# Patient Record
Sex: FEMALE | Race: BLACK OR AFRICAN AMERICAN | NOT HISPANIC OR LATINO | URBAN - METROPOLITAN AREA
[De-identification: names, ages, dates, MRNs, and addresses within clinical notes are randomized per-mention and may not be internally consistent; named-entity substitution may affect disease eponyms.]

---

## 2024-07-22 ENCOUNTER — INPATIENT (INPATIENT)
Facility: HOSPITAL | Age: 29
LOS: 5 days | Discharge: HOME | DRG: 761 | End: 2024-07-28
Attending: OBSTETRICS & GYNECOLOGY | Admitting: OBSTETRICS & GYNECOLOGY
Payer: SELF-PAY

## 2024-07-22 ENCOUNTER — TRANSCRIPTION ENCOUNTER (OUTPATIENT)
Age: 29
End: 2024-07-22

## 2024-07-22 VITALS
HEIGHT: 67 IN | SYSTOLIC BLOOD PRESSURE: 135 MMHG | HEART RATE: 120 BPM | RESPIRATION RATE: 15 BRPM | DIASTOLIC BLOOD PRESSURE: 93 MMHG | TEMPERATURE: 98 F | WEIGHT: 259.93 LBS | OXYGEN SATURATION: 100 %

## 2024-07-22 DIAGNOSIS — N83.519 TORSION OF OVARY AND OVARIAN PEDICLE, UNSPECIFIED SIDE: ICD-10-CM

## 2024-07-22 LAB
ADD ON TEST-SPECIMEN IN LAB: SIGNIFICANT CHANGE UP
ALBUMIN SERPL ELPH-MCNC: 3.7 G/DL — SIGNIFICANT CHANGE UP (ref 3.3–5)
ALP SERPL-CCNC: 211 U/L — HIGH (ref 40–120)
ALT FLD-CCNC: 46 U/L — HIGH (ref 10–45)
ANION GAP SERPL CALC-SCNC: 17 MMOL/L — SIGNIFICANT CHANGE UP (ref 5–17)
APPEARANCE UR: ABNORMAL
APTT BLD: 39.6 SEC — HIGH (ref 24.5–35.6)
AST SERPL-CCNC: 32 U/L — SIGNIFICANT CHANGE UP (ref 10–40)
BACTERIA # UR AUTO: ABNORMAL /HPF
BASOPHILS # BLD AUTO: 0.02 K/UL — SIGNIFICANT CHANGE UP (ref 0–0.2)
BASOPHILS NFR BLD AUTO: 0.2 % — SIGNIFICANT CHANGE UP (ref 0–2)
BILIRUB SERPL-MCNC: 0.5 MG/DL — SIGNIFICANT CHANGE UP (ref 0.2–1.2)
BILIRUB UR-MCNC: ABNORMAL
BLD GP AB SCN SERPL QL: NEGATIVE — SIGNIFICANT CHANGE UP
BUN SERPL-MCNC: 17 MG/DL — SIGNIFICANT CHANGE UP (ref 7–23)
CALCIUM SERPL-MCNC: 9.6 MG/DL — SIGNIFICANT CHANGE UP (ref 8.4–10.5)
CAST: 8 /LPF — HIGH (ref 0–4)
CHLORIDE SERPL-SCNC: 99 MMOL/L — SIGNIFICANT CHANGE UP (ref 96–108)
CO2 SERPL-SCNC: 23 MMOL/L — SIGNIFICANT CHANGE UP (ref 22–31)
COLOR SPEC: SIGNIFICANT CHANGE UP
CREAT SERPL-MCNC: 0.95 MG/DL — SIGNIFICANT CHANGE UP (ref 0.5–1.3)
DIFF PNL FLD: ABNORMAL
EGFR: 83 ML/MIN/1.73M2 — SIGNIFICANT CHANGE UP
EOSINOPHIL # BLD AUTO: 0.06 K/UL — SIGNIFICANT CHANGE UP (ref 0–0.5)
EOSINOPHIL NFR BLD AUTO: 0.5 % — SIGNIFICANT CHANGE UP (ref 0–6)
FLUAV AG NPH QL: SIGNIFICANT CHANGE UP
FLUBV AG NPH QL: SIGNIFICANT CHANGE UP
GLUCOSE SERPL-MCNC: 121 MG/DL — HIGH (ref 70–99)
GLUCOSE UR QL: NEGATIVE MG/DL — SIGNIFICANT CHANGE UP
HCG UR QL: NEGATIVE — SIGNIFICANT CHANGE UP
HCT VFR BLD CALC: 32.6 % — LOW (ref 34.5–45)
HCV AB S/CO SERPL IA: 0.06 S/CO — SIGNIFICANT CHANGE UP
HCV AB SERPL-IMP: SIGNIFICANT CHANGE UP
HGB BLD-MCNC: 10.8 G/DL — LOW (ref 11.5–15.5)
HIV 1 & 2 AB SERPL IA.RAPID: SIGNIFICANT CHANGE UP
IMM GRANULOCYTES NFR BLD AUTO: 0.6 % — SIGNIFICANT CHANGE UP (ref 0–0.9)
INR BLD: 1.37 RATIO — HIGH (ref 0.85–1.18)
KETONES UR-MCNC: ABNORMAL MG/DL
LEUKOCYTE ESTERASE UR-ACNC: ABNORMAL
LIDOCAIN IGE QN: 176 U/L — HIGH (ref 7–60)
LYMPHOCYTES # BLD AUTO: 1.89 K/UL — SIGNIFICANT CHANGE UP (ref 1–3.3)
LYMPHOCYTES # BLD AUTO: 14.4 % — SIGNIFICANT CHANGE UP (ref 13–44)
MCHC RBC-ENTMCNC: 27.3 PG — SIGNIFICANT CHANGE UP (ref 27–34)
MCHC RBC-ENTMCNC: 33.1 GM/DL — SIGNIFICANT CHANGE UP (ref 32–36)
MCV RBC AUTO: 82.5 FL — SIGNIFICANT CHANGE UP (ref 80–100)
MONOCYTES # BLD AUTO: 0.99 K/UL — HIGH (ref 0–0.9)
MONOCYTES NFR BLD AUTO: 7.5 % — SIGNIFICANT CHANGE UP (ref 2–14)
NEUTROPHILS # BLD AUTO: 10.1 K/UL — HIGH (ref 1.8–7.4)
NEUTROPHILS NFR BLD AUTO: 76.8 % — SIGNIFICANT CHANGE UP (ref 43–77)
NITRITE UR-MCNC: NEGATIVE — SIGNIFICANT CHANGE UP
NRBC # BLD: 0 /100 WBCS — SIGNIFICANT CHANGE UP (ref 0–0)
PH UR: 6 — SIGNIFICANT CHANGE UP (ref 5–8)
PLATELET # BLD AUTO: 604 K/UL — HIGH (ref 150–400)
POTASSIUM SERPL-MCNC: 3.8 MMOL/L — SIGNIFICANT CHANGE UP (ref 3.5–5.3)
POTASSIUM SERPL-SCNC: 3.8 MMOL/L — SIGNIFICANT CHANGE UP (ref 3.5–5.3)
PROT SERPL-MCNC: 8.3 G/DL — SIGNIFICANT CHANGE UP (ref 6–8.3)
PROT UR-MCNC: 100 MG/DL
PROTHROM AB SERPL-ACNC: 14.3 SEC — HIGH (ref 9.5–13)
RBC # BLD: 3.95 M/UL — SIGNIFICANT CHANGE UP (ref 3.8–5.2)
RBC # FLD: 15.1 % — HIGH (ref 10.3–14.5)
RBC CASTS # UR COMP ASSIST: 51 /HPF — HIGH (ref 0–4)
REVIEW: SIGNIFICANT CHANGE UP
RH IG SCN BLD-IMP: POSITIVE — SIGNIFICANT CHANGE UP
RSV RNA NPH QL NAA+NON-PROBE: SIGNIFICANT CHANGE UP
SARS-COV-2 RNA SPEC QL NAA+PROBE: SIGNIFICANT CHANGE UP
SODIUM SERPL-SCNC: 139 MMOL/L — SIGNIFICANT CHANGE UP (ref 135–145)
SP GR SPEC: >1.03 — HIGH (ref 1–1.03)
SQUAMOUS # UR AUTO: 13 /HPF — HIGH (ref 0–5)
UROBILINOGEN FLD QL: 1 MG/DL — SIGNIFICANT CHANGE UP (ref 0.2–1)
WBC # BLD: 13.14 K/UL — HIGH (ref 3.8–10.5)
WBC # FLD AUTO: 13.14 K/UL — HIGH (ref 3.8–10.5)
WBC UR QL: 17 /HPF — HIGH (ref 0–5)

## 2024-07-22 PROCEDURE — 99291 CRITICAL CARE FIRST HOUR: CPT

## 2024-07-22 PROCEDURE — 93975 VASCULAR STUDY: CPT | Mod: 26

## 2024-07-22 PROCEDURE — 76830 TRANSVAGINAL US NON-OB: CPT | Mod: 26

## 2024-07-22 PROCEDURE — 74177 CT ABD & PELVIS W/CONTRAST: CPT | Mod: 26,MC

## 2024-07-22 PROCEDURE — 76856 US EXAM PELVIC COMPLETE: CPT | Mod: 26

## 2024-07-22 RX ORDER — ONDANSETRON HCL/PF 4 MG/2 ML
4 VIAL (ML) INJECTION EVERY 8 HOURS
Refills: 0 | Status: DISCONTINUED | OUTPATIENT
Start: 2024-07-22 | End: 2024-07-23

## 2024-07-22 RX ORDER — DEXTROSE MONOHYDRATE, SODIUM CHLORIDE, SODIUM LACTATE, CALCIUM CHLORIDE, MAGNESIUM CHLORIDE 1.5; 538; 448; 18.4; 5.08 G/100ML; MG/100ML; MG/100ML; MG/100ML; MG/100ML
1000 SOLUTION INTRAPERITONEAL
Refills: 0 | Status: DISCONTINUED | OUTPATIENT
Start: 2024-07-22 | End: 2024-07-23

## 2024-07-22 RX ORDER — MAGNESIUM, ALUMINUM HYDROXIDE 200-225/5
30 SUSPENSION, ORAL (FINAL DOSE FORM) ORAL ONCE
Refills: 0 | Status: COMPLETED | OUTPATIENT
Start: 2024-07-22 | End: 2024-07-22

## 2024-07-22 RX ORDER — ONDANSETRON HCL/PF 4 MG/2 ML
4 VIAL (ML) INJECTION ONCE
Refills: 0 | Status: COMPLETED | OUTPATIENT
Start: 2024-07-22 | End: 2024-07-22

## 2024-07-22 RX ORDER — KETOROLAC TROMETHAMINE 10 MG
30 TABLET ORAL EVERY 6 HOURS
Refills: 0 | Status: DISCONTINUED | OUTPATIENT
Start: 2024-07-22 | End: 2024-07-23

## 2024-07-22 RX ORDER — FAMOTIDINE 40 MG/1
20 TABLET, FILM COATED ORAL ONCE
Refills: 0 | Status: COMPLETED | OUTPATIENT
Start: 2024-07-22 | End: 2024-07-22

## 2024-07-22 RX ORDER — ACETAMINOPHEN 500 MG
1000 TABLET ORAL EVERY 6 HOURS
Refills: 0 | Status: DISCONTINUED | OUTPATIENT
Start: 2024-07-22 | End: 2024-07-23

## 2024-07-22 RX ORDER — ACETAMINOPHEN 500 MG
1000 TABLET ORAL ONCE
Refills: 0 | Status: COMPLETED | OUTPATIENT
Start: 2024-07-22 | End: 2024-07-22

## 2024-07-22 RX ORDER — BACTERIOSTATIC SODIUM CHLORIDE 0.9 %
1000 VIAL (ML) INJECTION ONCE
Refills: 0 | Status: COMPLETED | OUTPATIENT
Start: 2024-07-22 | End: 2024-07-22

## 2024-07-22 RX ADMIN — FAMOTIDINE 20 MILLIGRAM(S): 40 TABLET, FILM COATED ORAL at 11:49

## 2024-07-22 RX ADMIN — Medication 4 MILLIGRAM(S): at 16:10

## 2024-07-22 RX ADMIN — Medication 4 MILLIGRAM(S): at 15:37

## 2024-07-22 RX ADMIN — Medication 400 MILLIGRAM(S): at 11:48

## 2024-07-22 RX ADMIN — DEXTROSE MONOHYDRATE, SODIUM CHLORIDE, SODIUM LACTATE, CALCIUM CHLORIDE, MAGNESIUM CHLORIDE 125 MILLILITER(S): 1.5; 538; 448; 18.4; 5.08 SOLUTION INTRAPERITONEAL at 21:37

## 2024-07-22 RX ADMIN — Medication 1000 MILLIGRAM(S): at 12:15

## 2024-07-22 RX ADMIN — Medication 100 MILLIGRAM(S): at 13:29

## 2024-07-22 RX ADMIN — Medication 30 MILLIGRAM(S): at 22:16

## 2024-07-22 RX ADMIN — Medication 400 MILLIGRAM(S): at 22:16

## 2024-07-22 RX ADMIN — Medication 30 MILLILITER(S): at 11:49

## 2024-07-22 RX ADMIN — Medication 4 MILLIGRAM(S): at 19:29

## 2024-07-22 RX ADMIN — Medication 4 MILLIGRAM(S): at 12:10

## 2024-07-22 RX ADMIN — Medication 4 MILLIGRAM(S): at 23:26

## 2024-07-22 RX ADMIN — Medication 1000 MILLILITER(S): at 11:49

## 2024-07-22 NOTE — H&P ADULT - NSHPLABSRESULTS_GEN_ALL_CORE
LABS:    Pregnancy Profile, Urine: Negative (07-22-24 @ 12:10)                          10.8   13.14 )-----------( 604      ( 22 Jul 2024 11:07 )             32.6     07-22    139  |  99  |  17  ----------------------------<  121<H>  3.8   |  23  |  0.95    Ca    9.6      22 Jul 2024 11:07    TPro  8.3  /  Alb  3.7  /  TBili  0.5  /  DBili  x   /  AST  32  /  ALT  46<H>  /  AlkPhos  211<H>  07-22    I&O's Detail      Urinalysis Basic - ( 22 Jul 2024 12:10 )    Color: Dark Yellow / Appearance: Cloudy / SG: >1.030 / pH: x  Gluc: x / Ketone: Trace mg/dL  / Bili: Small / Urobili: 1.0 mg/dL   Blood: x / Protein: 100 mg/dL / Nitrite: Negative   Leuk Esterase: Small / RBC: 51 /HPF / WBC 17 /HPF   Sq Epi: x / Non Sq Epi: 13 /HPF / Bacteria: Few /HPF    < from: CT Abdomen and Pelvis w/ IV Cont (07.22.24 @ 16:17) >    PROCEDURE DATE:  07/22/2024      INTERPRETATION:  CLINICAL INFORMATION: upper abd pain w vomiting eval   acute process MCT    FINDINGS:    LOWER CHEST: Within normal limits.    LIVER: Within normal limits.  BILE DUCTS: Normal caliber.  GALLBLADDER: Within normal limits.  SPLEEN: Within normal limits.  PANCREAS: Within normal limits.  ADRENALS: Within normal limits.  KIDNEYS/URETERS: Right renal scarring.    BLADDER: Within normal limits.  REPRODUCTIVE ORGANS: There is a 15.8 x 14.4 cm hypodense structure within   the midline of the pelvis likely arising from the left ovary. There is an   additional 7.8 cm lesion involving the left adnexa. There is mild   surrounding inflammation.    BOWEL: There is dilated small bowel with a transition point in the   posterior lower abdomen near the adnexal mass detailed above.  PERITONEUM: No ascites.  VESSELS:  Within normal limits.  RETROPERITONEUM/LYMPH NODES: No lymphadenopathy.  ABDOMINAL WALL: Within normal limits.  BONES: Within normal limits.    IMPRESSION: Markedly enlarged pelvic mass likely arising from the left   ovary suspicious for acute torsion.    There is an associated small bowel obstruction.    < from: US Pelvis Complete (07.22.24 @ 19:11) >    FINDINGS:  Uterus: 7.8 x 3.9 x 5.4 cm. Within normal limits.  Endometrium: 7 mm. Within normal limits.    Right ovary: 5.6 x 2.6 x 3.5 cm there is a cyst measuring measuring 3.3 x   1.8 x 2.9 cm. Arterial and venous waveforms are submitted.  Left ovary: A large multiloculated cystic lesion is seen within the left   adnexa measuring up to 21.6 x 9.7 x 14.3 cm. Cyst within this collection   measures 16.7 x 12.4 x 15.7 cm. Arterial and venous waveforms are not   well visualized in this area.    Fluid: Small amount of fluid within the pelvis.    Visualized bladder demonstrates low-level echoes. Few distended loops of   bowel visualized on transabdominal views.    IMPRESSION:  Large multiloculated cystic lesion within the left adnexa measuring up to   21.6 cm and corresponds to cystic areas seen on CT abdomen and pelvis.   Arterial and venous waveform not completely visualized in this area.   Findings may represent ovarian torsion.

## 2024-07-22 NOTE — CONSULT NOTE ADULT - ATTENDING COMMENTS
DATE OF SERVICE: 07-22-24 @ 21:07    28F with PMHx as above here with 2 wks of abdominal discomfort and bloating. Has been intermittently nauseous. Passing gas, last time 12 hrs ago, last BM today, no nausea 3d.  VSS  WBC 13  CT with large pelvic mass and ? SBO  abd soft mildly distended, nontender    Pt not clinically obstructed, having bowel fx, had been tolerating a diet at home. Likely compression of bowel 2/2 mass effect from the large pelvic mass  GYN planning for exploration  Surgery available intraop if needed

## 2024-07-22 NOTE — ED ADULT NURSE REASSESSMENT NOTE - NS ED NURSE REASSESS COMMENT FT1
OB/GYN at bedside.
instructed pt/mother to maintain NPO until CT results available
pt at US, gyn consult pending
Received report from JULISSA Tejeda. Patient a/ox4, breathing spontaneously and unlabored. Patient endorses 10/10 abdominal and back pain. MD Bill made aware. MD Bill to order IV Morphine. Patient denies nausea, vomiting, chest pain and dyspnea. Safety and comfort provided.

## 2024-07-22 NOTE — ED ADULT NURSE NOTE - NSFALLUNIVINTERV_ED_ALL_ED
Bed/Stretcher in lowest position, wheels locked, appropriate side rails in place/Call bell, personal items and telephone in reach/Instruct patient to call for assistance before getting out of bed/chair/stretcher/Non-slip footwear applied when patient is off stretcher/[place] to call system/Physically safe environment - no spills, clutter or unnecessary equipment/Purposeful proactive rounding/Room/bathroom lighting operational, light cord in reach

## 2024-07-22 NOTE — ED PROVIDER NOTE - PHYSICAL EXAMINATION
Gen: No acute distress  HEENT: Mucous membranes dry, pink conjunctivae, EOMI  CV: tachy, regular, no clubbing/cyanosis/edema  Resp: CTAB  GI: Abdomen soft, NT, ND. Normal BS. No rebound, no guarding  : No CVAT  Neuro: A&O x 3, moving all 4 extremities  MSK: No spine or joint tenderness to palpation  Skin: No rashes

## 2024-07-22 NOTE — ED PROVIDER NOTE - CLINICAL SUMMARY MEDICAL DECISION MAKING FREE TEXT BOX
Dr. Mcgregor: Well appearing pt p/w dry mucosa, tachycardia, diarrhea. No abdominal pain on exam. Concern for gastro/GI distress and tachycardia likely due to volume depletion. Will check electrolytes, hydrate, reassess. Abdominal exam is benign, no indication for abdominal imaging at this time.

## 2024-07-22 NOTE — H&P ADULT - ASSESSMENT
A/P: Patient is a 28y  with PMH HTN presenting to the ED with 2 weeks of diffuse abdominal distention and tenderness, and 3 days of intermittent nausea and vomiting. CTAP notable for markedly enlarged pelvic mass likely arising from the left ovary and associated small bowel obstruction. TVUS with large multiloculated cystic lesion within the left adnexa measuring up to 21.6 cm and corresponds to cystic areas seen on CT abdomen and pelvis. Arterial and venous waveform not completely visualized may represent ovarian torsion. Labs notable for WBC 13.1, plt 604, and lactate 1.4. Patient's physical exam notable for abdominal tenderness diffusely     INCOMPLETE      Assessment/Plan: 28y female POD# , s/p     Neuro: PCA for pain control. // Continue oral meds for pain control.  CV: Hemodynamically stable  Pulm: Saturating well on room air. Encourage ambulation.   GI: Advance diet as tolerated // Continue regular diet.   : [place] in place. Adequate UOP // [place] spontaneously.   Heme: Continue HSQ//Lovenox//Venodynes for DVT ppx. Increase OOB.    FEN: __??IVF??__  ID: Afebrile  Endo: No active issues  Dispo: Continue ____     Huong Linton, PGY2  d/w   A/P: Patient is a 28y  with PMH HTN presenting to the ED with 2 weeks of diffuse abdominal distention and tenderness, and 3 days of intermittent nausea and vomiting. CTAP notable for markedly enlarged pelvic mass likely arising from the left ovary and associated small bowel obstruction. TVUS with large multiloculated cystic lesion within the left adnexa measuring up to 21.6 cm and corresponds to cystic areas seen on CT abdomen and pelvis. Arterial and venous waveform not completely visualized may represent ovarian torsion. Labs notable for WBC 13.1, plt 604, and lactate 1.4. Patient's physical exam notable for mild diffuse abdominal tenderness, but does not appear to be an acute abdomen. Differential for pain includes pain from large space occupying ovarian cyst vs. pain from abdominal distention related to dilated loops of bowel and possible partial SBO vs. intermittent ovarian torsion. Patient admitted for symptomatic management and further workup of ovarian mass.    Neuro: Continue IV Tylenol, Toradol and PRN morphine for pain control.  CV: Hemodynamically stable  - f/u AM CBC  Pulm: Saturating well on room air. Encourage ambulation.   GI: NPO  : Voiding spontaneously.   - f/u AM CMP  GYN: concern for possible ovarian torsion vs. space occupying cyst  - Tumor markers sent, f/u CEA, CA 19-9,   - Discussed with GYN oncology, recommend pelvic washings, removing specimen intact in bag, and sending to pathology for permanent section  - If patient's status becomes more acute, will add on for emergent surgery  Heme: Continue Venodynes for DVT ppx. Increase OOB.    FEN: LR@125  ID: Afebrile  Endo: No active issues  Dispo: Admit to GYN    Huong Linton, PGY2  d/w Dr. Portillo

## 2024-07-22 NOTE — ED PROVIDER NOTE - PROGRESS NOTE DETAILS
Dr. Mcgregor: US unable to visualize gallbladder, will get CT a/p. +UA noted, will give ceftriaxone. Dr. Mcgregor: Pt reassessed, feels better but informed of ovarian mass, concern for torsion and SBO. surgery and obgyn consulted and they will see her in the ED. HEBER Dolan PGY-3: Patient with possible signs of torsion, no flow to that side or questionable flow.  Discussed with radiology.  Paged obgyn to my phone at this time, [phone].

## 2024-07-22 NOTE — H&P ADULT - NSHPPHYSICALEXAM_GEN_ALL_CORE
Vital Signs Last 24 Hrs  T(C): 36.7 (22 Jul 2024 22:04), Max: 37 (22 Jul 2024 10:30)  T(F): 98.1 (22 Jul 2024 22:04), Max: 98.6 (22 Jul 2024 10:30)  HR: 106 (22 Jul 2024 22:04) (106 - 120)  BP: 135/90 (22 Jul 2024 22:04) (135/90 - 153/91)  RR: 18 (22 Jul 2024 22:04) (15 - 18)  SpO2: 98% (22 Jul 2024 22:04) (98% - 100%)      Physical Exam:   General: Awake. Alert. No acute distress   Lungs: Unlabored breathing. No respiratory distress   Back: No CVA tenderness  Abd: Soft. No pain elicited w/ jostling of stretcher. Distended abdomen with palpated firmness. No rebound or guarding. Mild tenderness diffusely, not specifically tender in the right or left lower quadrants.  Ext: No calf tenderness bilaterally

## 2024-07-22 NOTE — H&P ADULT - ATTENDING COMMENTS
Patient assessed with resident. Patient presents with lower abdominal and back pain x 2 weeks. Reports associated new onset nausea/vomiting x 3d.  Reports tolerating diet and passing flatus.   Past medical and surgical history reviewed. NKDA   Vitals reviewed   Gen: no acute distress   Abd: obese, soft without rebound or guarding   Labs and imaging reviewed  A/P: left adnexal mass, torsion unlikely   -appreciate GYN Onc recs   -agree with resident assessment and plan  -plan for admission and surgical management     SAIGE Portillo

## 2024-07-22 NOTE — ED PROVIDER NOTE - ATTENDING CONTRIBUTION TO CARE
Dr. Mcgregor: I have personally performed a face to face bedside history and physical examination of this patient. I have discussed the history, examination, review of systems, assessment and plan of management with the resident. I have reviewed the electronic medical record and amended it to reflect my history, review of systems, physical exam, assessment and plan.    Dr. Mcgregor: This H&P has been written by myself in its entirety Patient was critically ill with a high probability of imminent or life threatening deterioration.  I have performed direct patient care (not related to procedure), additional history taking, interpretation of diagnostic studies, documentation, consultation with other physicians, telephone consultation with the patient's family  I have personally and independently provided the amount of critical care time documented below excluding time spent on separate procedures.  32 mins    Dr. Mcgregor: I have personally performed a face to face bedside history and physical examination of this patient. I have discussed the history, examination, review of systems, assessment and plan of management with the resident. I have reviewed the electronic medical record and amended it to reflect my history, review of systems, physical exam, assessment and plan.    Dr. Mcgregor: This H&P has been written by myself in its entirety

## 2024-07-22 NOTE — CONSULT NOTE ADULT - SUBJECTIVE AND OBJECTIVE BOX
Surgery Consult Note  Attending: Eze   Service: [facility]    HPI:  28F h/o HTN not on meds, presents with 2 weeks of diarrhea and diffuse abdominal cramps. Initially did not have diarrhea, mother gave her home remedy of bitterweed after which she developed watery diarrhea. NBNB and non bloody diarrhea. No fevers or chills. No chest pain or sob, no dysuria or hematuria, no vaginal bleeding or vag dc. Currently on her period. Sx not worse today but her mother made her come in.     PAST MEDICAL HISTORY:  PAST MEDICAL & SURGICAL HISTORY:  HTN (hypertension)    ALLERGIES:  No Known Allergies    SOCIAL HISTORY: Social alcohol use.     PHYSICAL EXAM:  General: NAD, resting comfortably  HEENT: NC/AT  Pulmonary: normal resp effort  Cardiovascular: NSR  Abdominal: soft, NT, mildly distended   Extremities: WWP    VITAL SIGNS:  Vital Signs Last 24 Hrs  T(C): 37 (22 Jul 2024 10:30), Max: 37 (22 Jul 2024 10:30)  T(F): 98.6 (22 Jul 2024 10:30), Max: 98.6 (22 Jul 2024 10:30)  HR: 110 (22 Jul 2024 10:30) (110 - 120)  BP: 144/97 (22 Jul 2024 10:30) (135/93 - 144/97)  BP(mean): --  RR: 16 (22 Jul 2024 10:30) (15 - 16)  SpO2: 99% (22 Jul 2024 10:30) (99% - 100%)    Parameters below as of 22 Jul 2024 09:53  Patient On (Oxygen Delivery Method): room air        I&O's Summary      LABS:                        10.8   13.14 )-----------( 604      ( 22 Jul 2024 11:07 )             32.6     07-22    139  |  99  |  17  ----------------------------<  121<H>  3.8   |  23  |  0.95    Ca    9.6      22 Jul 2024 11:07    TPro  8.3  /  Alb  3.7  /  TBili  0.5  /  DBili  x   /  AST  32  /  ALT  46<H>  /  AlkPhos  211<H>  07-22      Urinalysis Basic - ( 22 Jul 2024 12:10 )    Color: Dark Yellow / Appearance: Cloudy / SG: >1.030 / pH: x  Gluc: x / Ketone: Trace mg/dL  / Bili: Small / Urobili: 1.0 mg/dL   Blood: x / Protein: 100 mg/dL / Nitrite: Negative   Leuk Esterase: Small / RBC: 51 /HPF / WBC 17 /HPF   Sq Epi: x / Non Sq Epi: 13 /HPF / Bacteria: Few /HPF      CAPILLARY BLOOD GLUCOSE        LIVER FUNCTIONS - ( 22 Jul 2024 11:07 )  Alb: 3.7 g/dL / Pro: 8.3 g/dL / ALK PHOS: 211 U/L / ALT: 46 U/L / AST: 32 U/L / GGT: x

## 2024-07-22 NOTE — ED PROVIDER NOTE - IN ACCORDANCE WITH [place] STATE LAW, WE OFFER EVERY PATIENT A HEPATITIS C TEST. WOULD YOU LIKE TO BE TESTED TODAY?
DISPLAY PLAN FREE TEXT DISPLAY PLAN FREE TEXT DISPLAY PLAN FREE TEXT Yes, get tested DISPLAY PLAN FREE TEXT DISPLAY PLAN FREE TEXT DISPLAY PLAN FREE TEXT DISPLAY PLAN FREE TEXT DISPLAY PLAN FREE TEXT DISPLAY PLAN FREE TEXT Previously negative

## 2024-07-22 NOTE — CONSULT NOTE ADULT - SUBJECTIVE AND OBJECTIVE BOX
MADELINE INFANTE  28y  Female [id number]    HPI: 29yo G?, currently on her menses, who presented to the ED with x2 weeks of intermittent diffuse abdominal pain along with x3 days of intermittent n/v. Pain is described as "gas-like" that is exacerbated with position changes. She notes periods which she feels better. Currently, states she feels better, but had received Morphine at 1537. Patient reports x2 episodes of vomiting before       Denies any chest pain, shortness of breath, fevers, chills, n/v, light-headedness, dizziness, abnormal vaginal discharge, significant vaginal bleeding, dysuria, issues with bowel movements, or any other complaints     Name of Ob/Gyn Physician:     POB:    PGyn: Denies  MedHx:  SurgHx:  Meds:  Allergies:  Social: Denies any tobacco use, drug use, or alcohol use     Home meds:   Allergies    No Known Allergies    Intolerances        Hospital Meds:   MEDICATIONS  (STANDING):    MEDICATIONS  (PRN):    PAST MEDICAL & SURGICAL HISTORY:  HTN (hypertension)        FAMILY HISTORY:      Vital Signs Last 24 Hrs  T(C): 37 (22 Jul 2024 10:30), Max: 37 (22 Jul 2024 10:30)  T(F): 98.6 (22 Jul 2024 10:30), Max: 98.6 (22 Jul 2024 10:30)  HR: 110 (22 Jul 2024 10:30) (110 - 120)  BP: 144/97 (22 Jul 2024 10:30) (135/93 - 144/97)  BP(mean): --  RR: 16 (22 Jul 2024 10:30) (15 - 16)  SpO2: 99% (22 Jul 2024 10:30) (99% - 100%)    Parameters below as of 22 Jul 2024 09:53  Patient On (Oxygen Delivery Method): room air        Physical Exam:   General: Awake. Alert. No acute distress   CV: Regular rate and rhythm. No murmurs appreciated   Lungs: Clear to auscultation bilaterally. Unlabored breathing. No respiratory distress   Back: No CVA tenderness  Abd: Soft, non-tender, and non-distended. No pain elicted w/ jostling of stretcher    (w/ Chaperone ):  External vulva and labia w/o lesions or skin changes seen. Physiologic discharge/minimal old blood in the vault.    Bimanual exam with no cervical motion tenderness, uterus small and non-tender, and no adnexal tenderness bilaterally. Cervix closed vs. Cervix dilated    cm     Ext: No calf tenderness  bilaterally    LABS:      Pregnancy Profile, Urine: Negative (07-22-24 @ 12:10)                          10.8   13.14 )-----------( 604      ( 22 Jul 2024 11:07 )             32.6     07-22    139  |  99  |  17  ----------------------------<  121<H>  3.8   |  23  |  0.95    Ca    9.6      22 Jul 2024 11:07    TPro  8.3  /  Alb  3.7  /  TBili  0.5  /  DBili  x   /  AST  32  /  ALT  46<H>  /  AlkPhos  211<H>  07-22    I&O's Detail      Urinalysis Basic - ( 22 Jul 2024 12:10 )    Color: Dark Yellow / Appearance: Cloudy / SG: >1.030 / pH: x  Gluc: x / Ketone: Trace mg/dL  / Bili: Small / Urobili: 1.0 mg/dL   Blood: x / Protein: 100 mg/dL / Nitrite: Negative   Leuk Esterase: Small / RBC: 51 /HPF / WBC 17 /HPF   Sq Epi: x / Non Sq Epi: 13 /HPF / Bacteria: Few /HPF        RADIOLOGY & ADDITIONAL STUDIES: MADELINE INFANTE  28y  Female [id number]  - Seen initially at ~1703     HPI: 27yo G?, currently on her menses, who presented to the ED with x2 weeks of intermittent diffuse abdominal pain along with x3 days of intermittent n/v. Pain is described as "gas-like" that is exacerbated with position changes. She notes periods which she feels better. Currently, states she feels better, but had received Morphine at 1537. Patient reports x3 episodes of vomiting over the past x3 days. Heat pads improve the pain. Denies any episode of vomiting today. Patient having bowel movements and passing flatus     Denies any chest pain, shortness of breath, fevers, chills, n/v, light-headedness, dizziness, abnormal vaginal discharge, significant vaginal bleeding, dysuria, issues with bowel movements, or any other complaints. Last had PO at 3p     Name of Ob/Gyn Physician: Does not have one and has not seen one in ~10yrs    POB:    PGyn: Denies  MedHx: HTN  SurgHx: Denies   Meds: Vitamins  Allergies: NKDA  Social: Denies any tobacco use or drug use. Social alcohol use     Vital Signs Last 24 Hrs  T(C): 37 (22 Jul 2024 10:30), Max: 37 (22 Jul 2024 10:30)  T(F): 98.6 (22 Jul 2024 10:30), Max: 98.6 (22 Jul 2024 10:30)  HR: 110 (22 Jul 2024 10:30) (110 - 120)  BP: 144/97 (22 Jul 2024 10:30) (135/93 - 144/97)  BP(mean): --  RR: 16 (22 Jul 2024 10:30) (15 - 16)  SpO2: 99% (22 Jul 2024 10:30) (99% - 100%)    Parameters below as of 22 Jul 2024 09:53  Patient On (Oxygen Delivery Method): room air    Physical Exam:   General: Awake. Alert. No acute distress   Lungs: Unlabored breathing. No respiratory distress   Back: No CVA tenderness  Abd: Soft. No pain elicited w/ jostling of stretcher. Distended abdomen with palpated firmness. No rebound or guarding. Mild tenderness diffusely   Ext: No calf tenderness bilaterally    LABS:      Pregnancy Profile, Urine: Negative (07-22-24 @ 12:10)                          10.8   13.14 )-----------( 604      ( 22 Jul 2024 11:07 )             32.6     07-22    139  |  99  |  17  ----------------------------<  121<H>  3.8   |  23  |  0.95    Ca    9.6      22 Jul 2024 11:07    TPro  8.3  /  Alb  3.7  /  TBili  0.5  /  DBili  x   /  AST  32  /  ALT  46<H>  /  AlkPhos  211<H>  07-22    I&O's Detail      Urinalysis Basic - ( 22 Jul 2024 12:10 )    Color: Dark Yellow / Appearance: Cloudy / SG: >1.030 / pH: x  Gluc: x / Ketone: Trace mg/dL  / Bili: Small / Urobili: 1.0 mg/dL   Blood: x / Protein: 100 mg/dL / Nitrite: Negative   Leuk Esterase: Small / RBC: 51 /HPF / WBC 17 /HPF   Sq Epi: x / Non Sq Epi: 13 /HPF / Bacteria: Few /HPF        RADIOLOGY & ADDITIONAL STUDIES:    < from: CT Abdomen and Pelvis w/ IV Cont (07.22.24 @ 16:17) >    ACC: [id number] EXAM:  CT ABDOMEN AND PELVIS IC   ORDERED BY:  ALTHEA DELA CRUZ     PROCEDURE DATE:  07/22/2024          INTERPRETATION:  CLINICAL INFORMATION: upper abd pain w vomiting eval   acute process MCT    COMPARISON: None.    CONTRAST/COMPLICATIONS:  IV Contrast: Omnipaque 350  90 cc administered   10 cc discarded  Oral Contrast: NONE  Complications: None reported at time of study completion    PROCEDURE:  CT of the Abdomen and Pelvis was performed.  Sagittal and coronal reformats were performed.    FINDINGS:    LOWER CHEST: Within normal limits.    LIVER: Within normal limits.  BILE DUCTS: Normal caliber.  GALLBLADDER: Within normal limits.  SPLEEN: Within normal limits.  PANCREAS: Within normal limits.  ADRENALS: Within normal limits.  KIDNEYS/URETERS: Right renal scarring.    BLADDER: Within normal limits.  REPRODUCTIVE ORGANS: There is a 15.8 x 14.4 cm hypodense structure within   the midline of the pelvis likely arising from the left ovary. There is an   additional 7.8 cm lesion involving the left adnexa. There is mild   surrounding inflammation.    BOWEL: There is dilated small bowel with a transition point in the   posterior lower abdomen near the adnexal mass detailed above.  PERITONEUM: No ascites.  VESSELS:  Within normal limits.  RETROPERITONEUM/LYMPH NODES: No lymphadenopathy.  ABDOMINAL WALL: Within normal limits.  BONES: Within normal limits.    IMPRESSION: Markedly enlarged pelvic mass likely arising from the left   ovary suspicious for acute torsion.    There is an associated small bowel obstruction.    The findings were discussed with Dr. ALTHEA DELA CRUZ on 7/22/2024 4:40 PM.    --- End of Report ---            ANDIE LEE MD; Attending Radiologist  This document has been electronically signed. Jul 22 2024  4:41PM    < end of copied text >   MADELINE INFANTE  28y  Female [id number]  - Seen initially at ~1703     HPI: 29yo , currently on her menses, who presented to the ED with x2 weeks of intermittent diffuse abdominal pain along with x3 days of intermittent n/v. Pain is described as "gas-like" that is exacerbated with position changes. She notes periods which she feels better. Currently, states she feels better, but had received Morphine at 1537. Patient reports x3 episodes of vomiting over the past x3 days. Heat pads improve the pain. Denies any episode of vomiting today. Patient having bowel movements and passing flatus     Denies any chest pain, shortness of breath, fevers, chills, n/v, light-headedness, dizziness, abnormal vaginal discharge, significant vaginal bleeding, dysuria, issues with bowel movements, or any other complaints. Last had PO at 3p     Name of Ob/Gyn Physician: Does not have one and has not seen one in ~10yrs    POB:   -  x1  PGyn: Denies  MedHx: HTN  SurgHx: Denies   Meds: Vitamins  Allergies: NKDA  Social: Denies any tobacco use or drug use. Social alcohol use     Vital Signs Last 24 Hrs  T(C): 37 (2024 10:30), Max: 37 (2024 10:30)  T(F): 98.6 (2024 10:30), Max: 98.6 (2024 10:30)  HR: 110 (2024 10:30) (110 - 120)  BP: 144/97 (2024 10:30) (135/93 - 144/97)  BP(mean): --  RR: 16 (2024 10:30) (15 - 16)  SpO2: 99% (2024 10:30) (99% - 100%)    Parameters below as of 2024 09:53  Patient On (Oxygen Delivery Method): room air    Physical Exam:   General: Awake. Alert. No acute distress   Lungs: Unlabored breathing. No respiratory distress   Back: No CVA tenderness  Abd: Soft. No pain elicited w/ jostling of stretcher. Distended abdomen with palpated firmness. No rebound or guarding. Mild tenderness diffusely   Ext: No calf tenderness bilaterally    LABS:      Pregnancy Profile, Urine: Negative (24 @ 12:10)                          10.8   13.14 )-----------( 604      ( 2024 11:07 )             32.6         139  |  99  |  17  ----------------------------<  121<H>  3.8   |  23  |  0.95    Ca    9.6      2024 11:07    TPro  8.3  /  Alb  3.7  /  TBili  0.5  /  DBili  x   /  AST  32  /  ALT  46<H>  /  AlkPhos  211<H>      I&O's Detail      Urinalysis Basic - ( 2024 12:10 )    Color: Dark Yellow / Appearance: Cloudy / SG: >1.030 / pH: x  Gluc: x / Ketone: Trace mg/dL  / Bili: Small / Urobili: 1.0 mg/dL   Blood: x / Protein: 100 mg/dL / Nitrite: Negative   Leuk Esterase: Small / RBC: 51 /HPF / WBC 17 /HPF   Sq Epi: x / Non Sq Epi: 13 /HPF / Bacteria: Few /HPF        RADIOLOGY & ADDITIONAL STUDIES:    < from: CT Abdomen and Pelvis w/ IV Cont (24 @ 16:17) >    ACC: [id number] EXAM:  CT ABDOMEN AND PELVIS IC   ORDERED BY:  ALTHEA DELA CRUZ     PROCEDURE DATE:  2024          INTERPRETATION:  CLINICAL INFORMATION: upper abd pain w vomiting eval   acute process MCT    COMPARISON: None.    CONTRAST/COMPLICATIONS:  IV Contrast: Omnipaque 350  90 cc administered   10 cc discarded  Oral Contrast: NONE  Complications: None reported at time of study completion    PROCEDURE:  CT of the Abdomen and Pelvis was performed.  Sagittal and coronal reformats were performed.    FINDINGS:    LOWER CHEST: Within normal limits.    LIVER: Within normal limits.  BILE DUCTS: Normal caliber.  GALLBLADDER: Within normal limits.  SPLEEN: Within normal limits.  PANCREAS: Within normal limits.  ADRENALS: Within normal limits.  KIDNEYS/URETERS: Right renal scarring.    BLADDER: Within normal limits.  REPRODUCTIVE ORGANS: There is a 15.8 x 14.4 cm hypodense structure within   the midline of the pelvis likely arising from the left ovary. There is an   additional 7.8 cm lesion involving the left adnexa. There is mild   surrounding inflammation.    BOWEL: There is dilated small bowel with a transition point in the   posterior lower abdomen near the adnexal mass detailed above.  PERITONEUM: No ascites.  VESSELS:  Within normal limits.  RETROPERITONEUM/LYMPH NODES: No lymphadenopathy.  ABDOMINAL WALL: Within normal limits.  BONES: Within normal limits.    IMPRESSION: Markedly enlarged pelvic mass likely arising from the left   ovary suspicious for acute torsion.    There is an associated small bowel obstruction.    The findings were discussed with Dr. ALTHEA DELA CRUZ on 2024 4:40 PM.    --- End of Report ---            ANDIE LEE MD; Attending Radiologist  This document has been electronically signed. 2024  4:41PM    < end of copied text >

## 2024-07-22 NOTE — H&P ADULT - HISTORY OF PRESENT ILLNESS
MADELINE INFANTE  28y  Female [id number]    HPI: 29yo , currently on her menses, who presented to the ED with x2 weeks of intermittent diffuse abdominal pain along with x3 days of intermittent n/v. Pain is described as "gas-like" that is exacerbated with position changes. She notes periods which she feels better. Currently, states she feels better, but had received Morphine at 1537. Patient reports x3 episodes of vomiting over the past x3 days, but has not vomited for the past 12 hours. Heat pads improve the pain. Denies any episode of vomiting today. Patient having bowel movements and passing flatus.    Denies any chest pain, shortness of breath, fevers, chills, n/v, light-headedness, dizziness, abnormal vaginal discharge, significant vaginal bleeding, dysuria, issues with bowel movements, or any other complaints. Last had PO at 3p     Name of Ob/Gyn Physician: Does not have one and has not seen one in ~10yrs    POB:   -  x1 (12y ago)  PGyn: Denies  MedHx: HTN  SurgHx: Denies   Meds: Vitamins  Allergies: NKDA  Social: Denies any tobacco use or drug use. Social alcohol use

## 2024-07-22 NOTE — ED ADULT NURSE NOTE - OBJECTIVE STATEMENT
Pt is a 29 yo F w/ PMHx of HTN complaining of ABD pain. Pt states she has been having ABD discomfort for 2 weeks w/ intermittent Vomitting and diarrhea. Pt reports she vomited last 3 days ago. Pt denies blood in vomit or diarrhea. Pt reports pain felt like bloating in all 4 quadrants of ABD, pt denies current pain. Pt denies urinary s/s. Pt is AOx4, airway clear and patent, equal chest rise and fall, pulses intact, skin intact, discomfort upon palpation to upper left quadrant described as "bloated feeling", bowel sounds present x4 quadrants, motor and sensory skills intact, pt hemodynamically stable. Pt denies LOC, SOB, chest pain, current D/V, ABD pain, and fever or chills. Pt placed in locked lowered stretcher w/ rails raised.

## 2024-07-22 NOTE — ED PROVIDER NOTE - CARE PLAN
1 Principal Discharge DX:	Nausea, vomiting and diarrhea   Principal Discharge DX:	Nausea, vomiting and diarrhea  Secondary Diagnosis:	Acute UTI   Principal Discharge DX:	Ovarian torsion  Secondary Diagnosis:	Acute UTI  Secondary Diagnosis:	Small bowel obstruction

## 2024-07-22 NOTE — ED PROVIDER NOTE - OBJECTIVE STATEMENT
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 Initially did not have diarrhea, mother gave her home remedy of bitterweed after which she developed watery diarrhea. +n/v x 1, NBNB and non bloody diarrhea. No fevers or chills. No chest pain or sob, no dysuria or hematuria, no vaginal bleeding or vag dc. Currently on her period. Sx not worse today but her mother made her come in. Before the start of her sx but had a work out, but does denies dark urine or body aches.  No travel, no sick contacts, no abx.

## 2024-07-23 LAB
ALBUMIN SERPL ELPH-MCNC: 3.5 G/DL — SIGNIFICANT CHANGE UP (ref 3.3–5)
ALP SERPL-CCNC: 186 U/L — HIGH (ref 40–120)
ALT FLD-CCNC: 37 U/L — SIGNIFICANT CHANGE UP (ref 10–45)
ANION GAP SERPL CALC-SCNC: 14 MMOL/L — SIGNIFICANT CHANGE UP (ref 5–17)
AST SERPL-CCNC: 25 U/L — SIGNIFICANT CHANGE UP (ref 10–40)
BASOPHILS # BLD AUTO: 0.01 K/UL — SIGNIFICANT CHANGE UP (ref 0–0.2)
BASOPHILS NFR BLD AUTO: 0.1 % — SIGNIFICANT CHANGE UP (ref 0–2)
BILIRUB SERPL-MCNC: 0.5 MG/DL — SIGNIFICANT CHANGE UP (ref 0.2–1.2)
BUN SERPL-MCNC: 18 MG/DL — SIGNIFICANT CHANGE UP (ref 7–23)
CALCIUM SERPL-MCNC: 9.5 MG/DL — SIGNIFICANT CHANGE UP (ref 8.4–10.5)
CANCER AG125 SERPL-ACNC: 161 U/ML — HIGH
CANCER AG19-9 SERPL-ACNC: 10 U/ML — SIGNIFICANT CHANGE UP
CEA SERPL-MCNC: 0.7 NG/ML — SIGNIFICANT CHANGE UP (ref 0–3.8)
CHLORIDE SERPL-SCNC: 100 MMOL/L — SIGNIFICANT CHANGE UP (ref 96–108)
CO2 SERPL-SCNC: 25 MMOL/L — SIGNIFICANT CHANGE UP (ref 22–31)
CREAT SERPL-MCNC: 0.83 MG/DL — SIGNIFICANT CHANGE UP (ref 0.5–1.3)
CULTURE RESULTS: SIGNIFICANT CHANGE UP
EGFR: 97 ML/MIN/1.73M2 — SIGNIFICANT CHANGE UP
EOSINOPHIL # BLD AUTO: 0.03 K/UL — SIGNIFICANT CHANGE UP (ref 0–0.5)
EOSINOPHIL NFR BLD AUTO: 0.2 % — SIGNIFICANT CHANGE UP (ref 0–6)
GLUCOSE SERPL-MCNC: 96 MG/DL — SIGNIFICANT CHANGE UP (ref 70–99)
HCT VFR BLD CALC: 31.7 % — LOW (ref 34.5–45)
HGB BLD-MCNC: 10.4 G/DL — LOW (ref 11.5–15.5)
IMM GRANULOCYTES NFR BLD AUTO: 0.5 % — SIGNIFICANT CHANGE UP (ref 0–0.9)
LYMPHOCYTES # BLD AUTO: 1.75 K/UL — SIGNIFICANT CHANGE UP (ref 1–3.3)
LYMPHOCYTES # BLD AUTO: 12.2 % — LOW (ref 13–44)
MCHC RBC-ENTMCNC: 26.9 PG — LOW (ref 27–34)
MCHC RBC-ENTMCNC: 32.8 GM/DL — SIGNIFICANT CHANGE UP (ref 32–36)
MCV RBC AUTO: 81.9 FL — SIGNIFICANT CHANGE UP (ref 80–100)
MONOCYTES # BLD AUTO: 0.85 K/UL — SIGNIFICANT CHANGE UP (ref 0–0.9)
MONOCYTES NFR BLD AUTO: 5.9 % — SIGNIFICANT CHANGE UP (ref 2–14)
NEUTROPHILS # BLD AUTO: 11.68 K/UL — HIGH (ref 1.8–7.4)
NEUTROPHILS NFR BLD AUTO: 81.1 % — HIGH (ref 43–77)
NRBC # BLD: 0 /100 WBCS — SIGNIFICANT CHANGE UP (ref 0–0)
PLATELET # BLD AUTO: 615 K/UL — HIGH (ref 150–400)
POTASSIUM SERPL-MCNC: 5 MMOL/L — SIGNIFICANT CHANGE UP (ref 3.5–5.3)
POTASSIUM SERPL-SCNC: 5 MMOL/L — SIGNIFICANT CHANGE UP (ref 3.5–5.3)
PROT SERPL-MCNC: 8 G/DL — SIGNIFICANT CHANGE UP (ref 6–8.3)
RBC # BLD: 3.87 M/UL — SIGNIFICANT CHANGE UP (ref 3.8–5.2)
RBC # FLD: 15.2 % — HIGH (ref 10.3–14.5)
SODIUM SERPL-SCNC: 139 MMOL/L — SIGNIFICANT CHANGE UP (ref 135–145)
SPECIMEN SOURCE: SIGNIFICANT CHANGE UP
WBC # BLD: 14.39 K/UL — HIGH (ref 3.8–10.5)
WBC # FLD AUTO: 14.39 K/UL — HIGH (ref 3.8–10.5)

## 2024-07-23 PROCEDURE — 88112 CYTOPATH CELL ENHANCE TECH: CPT | Mod: 26

## 2024-07-23 PROCEDURE — 88331 PATH CONSLTJ SURG 1 BLK 1SPC: CPT | Mod: 26

## 2024-07-23 PROCEDURE — 58925 REMOVAL OF OVARIAN CYST(S): CPT

## 2024-07-23 PROCEDURE — 88305 TISSUE EXAM BY PATHOLOGIST: CPT | Mod: 26

## 2024-07-23 PROCEDURE — 99232 SBSQ HOSP IP/OBS MODERATE 35: CPT

## 2024-07-23 PROCEDURE — 88307 TISSUE EXAM BY PATHOLOGIST: CPT | Mod: 26

## 2024-07-23 PROCEDURE — 71045 X-RAY EXAM CHEST 1 VIEW: CPT | Mod: 26

## 2024-07-23 RX ORDER — ACETAMINOPHEN 500 MG
1000 TABLET ORAL EVERY 8 HOURS
Refills: 0 | Status: COMPLETED | OUTPATIENT
Start: 2024-07-23 | End: 2024-07-25

## 2024-07-23 RX ORDER — DEXTROSE MONOHYDRATE, SODIUM CHLORIDE, SODIUM LACTATE, CALCIUM CHLORIDE, MAGNESIUM CHLORIDE 1.5; 538; 448; 18.4; 5.08 G/100ML; MG/100ML; MG/100ML; MG/100ML; MG/100ML
1000 SOLUTION INTRAPERITONEAL
Refills: 0 | Status: DISCONTINUED | OUTPATIENT
Start: 2024-07-23 | End: 2024-07-27

## 2024-07-23 RX ORDER — METOPROLOL TARTRATE 100 MG
5 TABLET ORAL EVERY 6 HOURS
Refills: 0 | Status: DISCONTINUED | OUTPATIENT
Start: 2024-07-23 | End: 2024-07-23

## 2024-07-23 RX ORDER — HYDROMORPHONE HCL IN 0.9% NACL 0.2 MG/ML
30 PLASTIC BAG, INJECTION (ML) INTRAVENOUS
Refills: 0 | Status: DISCONTINUED | OUTPATIENT
Start: 2024-07-23 | End: 2024-07-24

## 2024-07-23 RX ORDER — HYDROMORPHONE HCL IN 0.9% NACL 0.2 MG/ML
0.5 PLASTIC BAG, INJECTION (ML) INTRAVENOUS
Refills: 0 | Status: DISCONTINUED | OUTPATIENT
Start: 2024-07-23 | End: 2024-07-25

## 2024-07-23 RX ORDER — HEPARIN SODIUM 1000 [USP'U]/ML
5000 INJECTION, SOLUTION INTRAVENOUS; SUBCUTANEOUS EVERY 8 HOURS
Refills: 0 | Status: DISCONTINUED | OUTPATIENT
Start: 2024-07-23 | End: 2024-07-28

## 2024-07-23 RX ORDER — KETOROLAC TROMETHAMINE 10 MG
30 TABLET ORAL EVERY 6 HOURS
Refills: 0 | Status: DISCONTINUED | OUTPATIENT
Start: 2024-07-23 | End: 2024-07-27

## 2024-07-23 RX ORDER — NALOXONE HYDROCHLORIDE 0.4 MG/ML
0.1 INJECTION, SOLUTION INTRAMUSCULAR; INTRAVENOUS; SUBCUTANEOUS
Refills: 0 | Status: DISCONTINUED | OUTPATIENT
Start: 2024-07-23 | End: 2024-07-28

## 2024-07-23 RX ORDER — ONDANSETRON HCL/PF 4 MG/2 ML
4 VIAL (ML) INJECTION EVERY 6 HOURS
Refills: 0 | Status: DISCONTINUED | OUTPATIENT
Start: 2024-07-23 | End: 2024-07-28

## 2024-07-23 RX ADMIN — Medication 400 MILLIGRAM(S): at 20:36

## 2024-07-23 RX ADMIN — Medication 30 MILLIGRAM(S): at 03:19

## 2024-07-23 RX ADMIN — Medication 1000 MILLIGRAM(S): at 21:53

## 2024-07-23 RX ADMIN — Medication 30 MILLIGRAM(S): at 23:32

## 2024-07-23 RX ADMIN — HEPARIN SODIUM 5000 UNIT(S): 1000 INJECTION, SOLUTION INTRAVENOUS; SUBCUTANEOUS at 21:29

## 2024-07-23 RX ADMIN — Medication 30 MILLILITER(S): at 19:35

## 2024-07-23 RX ADMIN — Medication 30 MILLILITER(S): at 17:54

## 2024-07-23 RX ADMIN — Medication 400 MILLIGRAM(S): at 07:56

## 2024-07-23 RX ADMIN — DEXTROSE MONOHYDRATE, SODIUM CHLORIDE, SODIUM LACTATE, CALCIUM CHLORIDE, MAGNESIUM CHLORIDE 125 MILLILITER(S): 1.5; 538; 448; 18.4; 5.08 SOLUTION INTRAPERITONEAL at 16:38

## 2024-07-23 RX ADMIN — Medication 30 MILLILITER(S): at 16:37

## 2024-07-23 RX ADMIN — Medication 30 MILLIGRAM(S): at 09:37

## 2024-07-23 RX ADMIN — Medication 30 MILLIGRAM(S): at 18:49

## 2024-07-23 NOTE — BRIEF OPERATIVE NOTE - OPERATION/FINDINGS
EUA noted normal appearing external genitalia. Large 20wk sized, mobile mass. Abdominal findings noted large 20-24cm left ovarian mass with ~3x4cm pedunculated lobulation both hemorraghic in appearance. Left fallopian tube encased with mass. Bowel and omentum attached to mass with filmy/inflammatory adhesions. Approximately 2-3cm area of serosal tear repaired by General Surgery (see separate OP Note). Small normal appearing uterus and right fallopian tube. Right ovary with 2cm simple cyst. Left ovarian mass NOT ruptured during procedure.  EUA noted normal appearing external genitalia. Large 20wk sized, mobile mass. Abdominal findings noted large 20-24cm left ovarian mass with ~3x4cm pedunculated lobulation both hemorraghic in appearance. Left fallopian tube encased with mass. left tube and ovary torsed x3 at the pedicle. Bowel and omentum attached to mass with filmy/inflammatory adhesions. Approximately 2-3cm area of serosal tear repaired by General Surgery (see separate OP Note). Small normal appearing uterus and right fallopian tube. Right ovary with 2cm simple cyst. Left ovarian mass NOT ruptured during procedure. Frozen reported as benign hemorrhagic cyst (final pathology pending).

## 2024-07-23 NOTE — PRE-OP CHECKLIST - 1.
What Is The Reason For Today's Visit?: Upper Body Skin Exam emotional support provided to patient and family

## 2024-07-23 NOTE — PRE-ANESTHESIA EVALUATION ADULT - NSANTHPMHFT_GEN_ALL_CORE
HPI: 29yo , currently on her menses, who presented to the ED with x2 weeks of intermittent diffuse abdominal pain along with x3 days of intermittent n/v. Pain is described as "gas-like" that is exacerbated with position changes. She notes periods which she feels better. Currently, states she feels better, but had received Morphine at 1537. Patient reports x3 episodes of vomiting over the past x3 days,

## 2024-07-23 NOTE — BRIEF OPERATIVE NOTE - NSICDXBRIEFPOSTOP_GEN_ALL_CORE_FT
POST-OP DIAGNOSIS:  Adnexal mass 23-Jul-2024 14:09:48  Amena Choudhury  SBO (small bowel obstruction) 23-Jul-2024 14:10:01  Amena Choudhury

## 2024-07-23 NOTE — BRIEF OPERATIVE NOTE - NSICDXBRIEFPREOP_GEN_ALL_CORE_FT
PRE-OP DIAGNOSIS:  Adnexal mass 23-Jul-2024 14:09:45 left adnexal mass Amena Choudhury  
PRE-OP DIAGNOSIS:  Adnexal mass 23-Jul-2024 14:09:45  Amena Choudhury  SBO (small bowel obstruction) 23-Jul-2024 14:09:57  Amena Choudhury

## 2024-07-23 NOTE — PRE-OP CHECKLIST - HEIGHT IN CM
[FreeTextEntry1] : 1. CAD: s/p silent inferolateral MI, s/p CABG x 3 08/01/14 in [place], CCS 1, s/p 11/05/20: exercise stress echo: EF 48%, basal inferior/inferolateral and apical lateral akinesis at rest, no inducible ischemia, 14.1 METS\par             - continue aggressive medical management \par             - continue ASA 81mg po qd \par             - will send for an exercise stress echocardiogram to rule out inducible ischemia \par  \par 2. Carotid stenosis, bilateral: 11/16/15: sono: b/l 1-19% prox ICA, LECA <50%. \par             - continue medical management\par \par 3. HTN: BP not at ACC/AHA 2017 guideline target: home BP Log optimal: \par             - continue valsartan 80mg po qd \par             - if BP remains above target next visit will up titrate anti-HTN regimen \par \par 4. Dyslipidemia: lipids optimal \par             - continue rosuvastatin 20mg po qd\par             - check lab work today \par \par 5. Syncope: likely vasovagal, however h/o CAD/CABG and ECG with PVCs:\par             - will send for an exercise stress echocardiogram to rule out inducible ischemia \par             - will order a 4 week MCT monitor ([employer]) to rule out dysrhythmia \par \par 
170.18

## 2024-07-23 NOTE — BRIEF OPERATIVE NOTE - NSICDXBRIEFPOSTOP_GEN_ALL_CORE_FT
POST-OP DIAGNOSIS:  Adnexal mass 23-Jul-2024 14:42:24 left adnexal mass Carolyn Patino  Right ovarian cyst 23-Jul-2024 14:42:38  Carolyn Patino

## 2024-07-23 NOTE — PROGRESS NOTE ADULT - SUBJECTIVE AND OBJECTIVE BOX
Gyn Progress Note HD#2    Subjective:   Patient seen and examined at bedside. There were no acute events overnight. Patient reports pain as overall controlled, last receiving opioids at 1130p. Patient feels her pain is moreso in her upper abdomen. Patient is otherwise ambulating and continuing to  pass flatus. They were made NPO overnight      Objective:  T(F): 98 (07-23-24 @ 05:30), Max: 98.6 (07-22-24 @ 10:30)  HR: 100 (07-23-24 @ 05:30) (97 - 120)  BP: 137/91 (07-23-24 @ 05:30) (133/84 - 153/91)  RR: 18 (07-23-24 @ 05:30) (15 - 18)  SpO2: 98% (07-23-24 @ 05:30) (98% - 100%)  Wt(kg): --  I&O's Summary    CAPILLARY BLOOD GLUCOSE          MEDICATIONS  (STANDING):  lactated ringers. 1000 milliLiter(s) (125 mL/Hr) IV Continuous <Continuous>    MEDICATIONS  (PRN):  acetaminophen   IVPB .. 1000 milliGRAM(s) IV Intermittent every 6 hours PRN Moderate Pain (4 - 6)  ketorolac   Injectable 30 milliGRAM(s) IV Push every 6 hours PRN Severe Pain (7 - 10)  morphine  - Injectable 4 milliGRAM(s) IV Push every 4 hours PRN Severe Pain (7 - 10)  ondansetron Injectable 4 milliGRAM(s) IV Push every 8 hours PRN Nausea and/or Vomiting      Physical Exam:  Constitutional: No acute distress. Resting in bed   CV: Regular rate and rhythm. No murmurs appreciated   Lungs: Clear to auscultation  bilaterally. No respiratory distress  Abdomen: Soft. Distended. Non-tender. Bowel sounds present throughout   Extremities: No calf tenderness bilaterally    LABS:  07-23    139    |  100    |  18     ----------------------------<  96     5.0     |  25     |  0.83   07-22    139    |  99     |  17     ----------------------------<  121<H>  3.8     |  23     |  0.95     Ca    9.5        23 Jul 2024 04:32  Ca    9.6        22 Jul 2024 11:07    TPro  8.0    /  Alb  3.5    /  TBili  0.5    /  DBili  x      /  AST  25     /  ALT  37     /  AlkPhos  186<H>  07-23  TPro  8.3    /  Alb  3.7    /  TBili  0.5    /  DBili  x      /  AST  32     /  ALT  46<H>  /  AlkPhos  211<H>  07-22        PT/INR - ( 22 Jul 2024 20:16 )   PT: 14.3 sec;   INR: 1.37 ratio         PTT - ( 22 Jul 2024 20:16 )  PTT:39.6 sec  Urinalysis Basic - ( 23 Jul 2024 04:32 )    Color: x / Appearance: x / SG: x / pH: x  Gluc: 96 mg/dL / Ketone: x  / Bili: x / Urobili: x   Blood: x / Protein: x / Nitrite: x   Leuk Esterase: x / RBC: x / WBC x   Sq Epi: x / Non Sq Epi: x / Bacteria: x                   Gyn Progress Note HD#2    Subjective:   Patient seen and examined at bedside. There were no acute events overnight. Patient reports pain as overall controlled, last receiving opioids at 1130p. Patient feels her pain is moreso in her upper abdomen. Patient is otherwise ambulating and continuing to  pass flatus. They were made NPO overnight.    Objective:  T(F): 98 (07-23-24 @ 05:30), Max: 98.6 (07-22-24 @ 10:30)  HR: 100 (07-23-24 @ 05:30) (97 - 120)  BP: 137/91 (07-23-24 @ 05:30) (133/84 - 153/91)  RR: 18 (07-23-24 @ 05:30) (15 - 18)  SpO2: 98% (07-23-24 @ 05:30) (98% - 100%)  Wt(kg): --  I&O's Summary    CAPILLARY BLOOD GLUCOSE    MEDICATIONS  (STANDING):  lactated ringers. 1000 milliLiter(s) (125 mL/Hr) IV Continuous <Continuous>    MEDICATIONS  (PRN):  acetaminophen   IVPB .. 1000 milliGRAM(s) IV Intermittent every 6 hours PRN Moderate Pain (4 - 6)  ketorolac   Injectable 30 milliGRAM(s) IV Push every 6 hours PRN Severe Pain (7 - 10)  morphine  - Injectable 4 milliGRAM(s) IV Push every 4 hours PRN Severe Pain (7 - 10)  ondansetron Injectable 4 milliGRAM(s) IV Push every 8 hours PRN Nausea and/or Vomiting      Physical Exam:  Constitutional: No acute distress. Resting in bed   CV: Regular rate and rhythm. No murmurs appreciated   Lungs: Clear to auscultation  bilaterally. No respiratory distress  Abdomen: Soft. Distended. Non-tender. Bowel sounds present throughout   Extremities: No calf tenderness bilaterally    LABS:  07-23    139    |  100    |  18     ----------------------------<  96     5.0     |  25     |  0.83   07-22    139    |  99     |  17     ----------------------------<  121<H>  3.8     |  23     |  0.95     Ca    9.5        23 Jul 2024 04:32  Ca    9.6        22 Jul 2024 11:07    TPro  8.0    /  Alb  3.5    /  TBili  0.5    /  DBili  x      /  AST  25     /  ALT  37     /  AlkPhos  186<H>  07-23  TPro  8.3    /  Alb  3.7    /  TBili  0.5    /  DBili  x      /  AST  32     /  ALT  46<H>  /  AlkPhos  211<H>  07-22        PT/INR - ( 22 Jul 2024 20:16 )   PT: 14.3 sec;   INR: 1.37 ratio         PTT - ( 22 Jul 2024 20:16 )  PTT:39.6 sec  Urinalysis Basic - ( 23 Jul 2024 04:32 )    Color: x / Appearance: x / SG: x / pH: x  Gluc: 96 mg/dL / Ketone: x  / Bili: x / Urobili: x   Blood: x / Protein: x / Nitrite: x   Leuk Esterase: x / RBC: x / WBC x   Sq Epi: x / Non Sq Epi: x / Bacteria: x

## 2024-07-23 NOTE — BRIEF OPERATIVE NOTE - NSICDXBRIEFPROCEDURE_GEN_ALL_CORE_FT
PROCEDURES:  Lysis of intestinal adhesions 23-Jul-2024 14:09:23  Amena Choudhury  Repair of serosal tear of small intestine 23-Jul-2024 14:09:37  Amena Choudhury  
PROCEDURES:  Laparotomy with left salpingo-oophorectomy 23-Jul-2024 14:40:34  Carolyn Patino  Open right ovarian cystectomy 23-Jul-2024 14:41:17  Carolyn Patino

## 2024-07-23 NOTE — BRIEF OPERATIVE NOTE - SPECIMENS
None
Left fallopian tube and ovary (containing mass), anterior cul de sac lesion, and right ovarian cyst

## 2024-07-23 NOTE — PROGRESS NOTE ADULT - SUBJECTIVE AND OBJECTIVE BOX
SURGERY DAILY PROGRESS NOTE    STATUS POST:      SUBJECTIVE: Pt seen and examined at bedside. Admits to _____. Denies chest pain, SOB, palpitations, HA, fever, chills, N/V.      OBJECTIVE:  Vital Signs Last 24 Hrs  T(C): 36.7 (23 Jul 2024 05:30), Max: 37 (22 Jul 2024 10:30)  T(F): 98 (23 Jul 2024 05:30), Max: 98.6 (22 Jul 2024 10:30)  HR: 100 (23 Jul 2024 05:30) (97 - 120)  BP: 137/91 (23 Jul 2024 05:30) (133/84 - 153/91)  BP(mean): --  RR: 18 (23 Jul 2024 05:30) (15 - 18)  SpO2: 98% (23 Jul 2024 05:30) (98% - 100%)    Parameters below as of 23 Jul 2024 05:30  Patient On (Oxygen Delivery Method): room air        I&O's Summary      Physical Exam:  General Appearance: Appears well, NAD, A& O x 3  Neck: Supple  Chest: Equal expansion bilaterally, equal breath sounds  CV: Pulse regular presently  Abdomen: Soft, nontense, appropriate incisional tenderness, dressings clean and dry and intact  Extremities: Grossly symmetric, SCD's in place     LABS:                        10.4   14.39 )-----------( 615      ( 23 Jul 2024 04:32 )             31.7     07-23    139  |  100  |  18  ----------------------------<  96  5.0   |  25  |  0.83    Ca    9.5      23 Jul 2024 04:32    TPro  8.0  /  Alb  3.5  /  TBili  0.5  /  DBili  x   /  AST  25  /  ALT  37  /  AlkPhos  186<H>  07-23    PT/INR - ( 22 Jul 2024 20:16 )   PT: 14.3 sec;   INR: 1.37 ratio         PTT - ( 22 Jul 2024 20:16 )  PTT:39.6 sec  Urinalysis Basic - ( 23 Jul 2024 04:32 )    Color: x / Appearance: x / SG: x / pH: x  Gluc: 96 mg/dL / Ketone: x  / Bili: x / Urobili: x   Blood: x / Protein: x / Nitrite: x   Leuk Esterase: x / RBC: x / WBC x   Sq Epi: x / Non Sq Epi: x / Bacteria: x        RADIOLOGY & ADDITIONAL STUDIES: SURGERY DAILY PROGRESS NOTE    STATUS POST:      SUBJECTIVE: Pt seen and examined at bedside. No acute events overnight. Pt continues to pass flatus.       OBJECTIVE:  Vital Signs Last 24 Hrs  T(C): 36.7 (23 Jul 2024 05:30), Max: 37 (22 Jul 2024 10:30)  T(F): 98 (23 Jul 2024 05:30), Max: 98.6 (22 Jul 2024 10:30)  HR: 100 (23 Jul 2024 05:30) (97 - 120)  BP: 137/91 (23 Jul 2024 05:30) (133/84 - 153/91)  BP(mean): --  RR: 18 (23 Jul 2024 05:30) (15 - 18)  SpO2: 98% (23 Jul 2024 05:30) (98% - 100%)    Parameters below as of 23 Jul 2024 05:30  Patient On (Oxygen Delivery Method): room air        I&O's Summary      Physical Exam:  General Appearance: Appears well, NAD, A& O x 3  Neck: Supple  Chest: Equal expansion bilaterally, equal breath sounds  Abdomen: Soft, ND/NT   Extremities: Grossly symmetric, SCD's in place     LABS:                        10.4   14.39 )-----------( 615      ( 23 Jul 2024 04:32 )             31.7     07-23    139  |  100  |  18  ----------------------------<  96  5.0   |  25  |  0.83    Ca    9.5      23 Jul 2024 04:32    TPro  8.0  /  Alb  3.5  /  TBili  0.5  /  DBili  x   /  AST  25  /  ALT  37  /  AlkPhos  186<H>  07-23    PT/INR - ( 22 Jul 2024 20:16 )   PT: 14.3 sec;   INR: 1.37 ratio         PTT - ( 22 Jul 2024 20:16 )  PTT:39.6 sec  Urinalysis Basic - ( 23 Jul 2024 04:32 )    Color: x / Appearance: x / SG: x / pH: x  Gluc: 96 mg/dL / Ketone: x  / Bili: x / Urobili: x   Blood: x / Protein: x / Nitrite: x   Leuk Esterase: x / RBC: x / WBC x   Sq Epi: x / Non Sq Epi: x / Bacteria: x        RADIOLOGY & ADDITIONAL STUDIES:

## 2024-07-23 NOTE — PROGRESS NOTE ADULT - ATTENDING COMMENTS
GYN Attending of the Week Addendum:    I agree with above resident's note.    28y  admitted for pain, abdominal distension, n/v in the setting of 22cm complex left adnexal mass causing mass effect. CA-125 mildly elevated, however premenopausal with low concern for malignancy per GYN oncology. Not clinically obstructed, despite imaging concerns. Appreciate general surgery consult. Plan for ex lap, pelvic washings, cystectomy possible salpiongo-oophorectomy with frozen section and gyn onc backup. Continue mechanical and chemical VTE ppx while inpatient.    Eda Patel MD GYN Attending of the Week Addendum:    I agree with above resident's note.    28y  admitted for pain, abdominal distension, n/v in the setting of 22cm complex left adnexal mass causing mass effect. CA-125 mildly elevated, however premenopausal with low concern for malignancy per GYN oncology. Not clinically obstructed, despite imaging concerns. Appreciate general surgery consult. Plan for ex lap, pelvic washings, cystectomy possible salpingo-oophorectomy with frozen section and gyn onc backup. Continue mechanical and chemical VTE ppx while inpatient.    Eda Patel MD

## 2024-07-23 NOTE — PRE-OP CHECKLIST - NEEDLE GAUGE
20
20
Review of tests, imaging, labs, documents, medical management, coordination of care and counseling.

## 2024-07-23 NOTE — PROGRESS NOTE ADULT - ASSESSMENT
28F h/o HTN not on meds, presents with 2 weeks of diarrhea and diffuse abdominal cramps. Found to have 15cm adnexal mass compressing her small bowel leading to small bowel dilation and obstruction.     Plan  - Patient not clinically obstructed: passing flatus/stool  - Small bowel dilation likely secondary to mass   - Will be available to assist ob/gyn team in operative planning   - Will follow     [facility], [id number]

## 2024-07-23 NOTE — PROGRESS NOTE ADULT - ASSESSMENT
28y  admitted after presenting with x2 weeks of abdominal distension and pain along with x3 days of intermittent n/v.  CTAP notable for markedly enlarged pelvic mass likely arising from the left ovary and associated small bowel obstruction. TVUS with large multiloculated cystic lesion within the left adnexa measuring up to 21.6 cm and corresponds to cystic areas seen on CT abdomen and pelvis. Arterial and venous waveform were not completely visualized may represent ovarian torsion. Physical exam at time of initial evaluation and this AM do not suggest an acute abdomen. Additionally, clinically, patient does not appear to be obstructed given she continues to pass flatus which general surgery concurs with (was consulted as well). Input from GYN/Onc was obtained overnight and patient was subsequently, admitted for further symptomatic management and work-up     Neuro: Continue IV Tylenol, Toradol and PRN morphine for pain control  CV: Hemodynamically stable. Will order for Lopressor PRN   - H/H stable, 10.8/32.6->10.4/31.7  Pulm: Saturating well on room air. Encourage ambulation.   GI: NPO. Zofran PRN  : Voiding spontaneously. No acute issues  GYN: Concern for intermittent torsion and/or mass-effect   - Tumor markers returned with normal range Ca 19-9 and CEA.  CA-125 elevated at 161, but notably <200 in the setting of no lymphadenopathy or ascites on CT  - Discussed with GYN oncology, recommend pelvic washings, removing specimen intact in bag, and sending to pathology for permanent section  - If patient's status becomes more acute, will add on for emergent surgery  Heme: Continue Venodynes for DVT ppx. Increase OOB.    FEN: LR@125  - AM CMP with noted improvement in Cr (0.95->0.83). Otherwise, wnl   ID: Afebrile  Endo: No active issues  Dispo: Inpatient management     Diogenes Fleming, PGY-3  Obstetrics and Gynecology  28y  admitted after presenting with x2 weeks of abdominal distension and pain along with x3 days of intermittent n/v.  CTAP notable for markedly enlarged pelvic mass likely arising from the left ovary and associated small bowel obstruction. TVUS with large multiloculated cystic lesion within the left adnexa measuring up to 21.6 cm and corresponds to cystic areas seen on CT abdomen and pelvis. Arterial and venous waveform were not completely visualized may represent ovarian torsion. Physical exam at time of initial evaluation and this AM do not suggest an acute abdomen. Additionally, clinically, patient does not appear to be obstructed given she continues to pass flatus which general surgery concurs with (was consulted as well). Input from GYN/Onc was obtained overnight and patient was subsequently, admitted for further symptomatic management and work-up     Neuro: Continue IV Tylenol, Toradol and PRN morphine for pain control  CV: Hemodynamically stable. Will order for Lopressor PRN   - H/H stable, 10.8/32.6->10.4/31.7  Pulm: Saturating well on room air. Encourage ambulation.   GI: NPO. Zofran PRN  : Voiding spontaneously. No acute issues  GYN: Concern for intermittent torsion and/or mass-effect   - Tumor markers returned with normal range Ca 19-9 and CEA.  CA-125 elevated at 161, but notably <200 in the setting of no lymphadenopathy or ascites on CT  - Discussed with GYN oncology, recommend pelvic washings, removing specimen intact in bag, and sending to pathology for permanent section  - If patient's status becomes more acute, will add on for emergent surgery  Heme: Continue Venodynes for DVT ppx. Increase OOB.    FEN: LR@125  - AM CMP with noted improvement in Cr (0.95->0.83). Otherwise, wnl   ID: Afebrile  Endo: No active issues  Dispo: Inpatient management     Diogenes Fleming, PGY-3  Obstetrics and Gynecology     ADDENDUM @9a  - Following discussion with GYN oncology, decision made to add patient on to OR for ex-lap, L ovarian cystectomy, possible LSO, with plan to send frozen pathology intra-op  - Patient remains NPO with IV fluids  - OR made aware    Tami Leon  PGY4

## 2024-07-23 NOTE — BRIEF OPERATIVE NOTE - OPERATION/FINDINGS
Intraop consult to examine bowel after removal of adnexal mass. Bowel run from LoT to cecum with lysis of several adhesions. Small bowel with severely dilated proximal to midileum where transition point noted with seromyotomy, corresponding to area previously adhesed to adnexal mass. Seromyotomy repaired transversely with interrupted 3-0 Vicryl sutures x 6. No other areas of injury identified. NGT position confirmed in stomach

## 2024-07-24 LAB
HCT VFR BLD CALC: 32.6 % — LOW (ref 34.5–45)
HGB BLD-MCNC: 10.5 G/DL — LOW (ref 11.5–15.5)
MCHC RBC-ENTMCNC: 26.9 PG — LOW (ref 27–34)
MCHC RBC-ENTMCNC: 32.2 GM/DL — SIGNIFICANT CHANGE UP (ref 32–36)
MCV RBC AUTO: 83.4 FL — SIGNIFICANT CHANGE UP (ref 80–100)
NRBC # BLD: 0 /100 WBCS — SIGNIFICANT CHANGE UP (ref 0–0)
PLATELET # BLD AUTO: 695 K/UL — HIGH (ref 150–400)
RBC # BLD: 3.91 M/UL — SIGNIFICANT CHANGE UP (ref 3.8–5.2)
RBC # FLD: 15.5 % — HIGH (ref 10.3–14.5)
WBC # BLD: 14.42 K/UL — HIGH (ref 3.8–10.5)
WBC # FLD AUTO: 14.42 K/UL — HIGH (ref 3.8–10.5)

## 2024-07-24 RX ORDER — IBUPROFEN 200 MG
1 TABLET ORAL
Qty: 0 | Refills: 0 | DISCHARGE

## 2024-07-24 RX ORDER — HYDROMORPHONE HCL IN 0.9% NACL 0.2 MG/ML
30 PLASTIC BAG, INJECTION (ML) INTRAVENOUS
Refills: 0 | Status: DISCONTINUED | OUTPATIENT
Start: 2024-07-24 | End: 2024-07-25

## 2024-07-24 RX ORDER — BACTERIOSTATIC SODIUM CHLORIDE 0.9 %
400 VIAL (ML) INJECTION ONCE
Refills: 0 | Status: COMPLETED | OUTPATIENT
Start: 2024-07-24 | End: 2024-07-24

## 2024-07-24 RX ORDER — ACETAMINOPHEN 500 MG
3 TABLET ORAL
Qty: 0 | Refills: 0 | DISCHARGE

## 2024-07-24 RX ADMIN — Medication 30 MILLIGRAM(S): at 11:21

## 2024-07-24 RX ADMIN — HEPARIN SODIUM 5000 UNIT(S): 1000 INJECTION, SOLUTION INTRAVENOUS; SUBCUTANEOUS at 14:03

## 2024-07-24 RX ADMIN — HEPARIN SODIUM 5000 UNIT(S): 1000 INJECTION, SOLUTION INTRAVENOUS; SUBCUTANEOUS at 05:11

## 2024-07-24 RX ADMIN — Medication 1000 MILLIGRAM(S): at 09:23

## 2024-07-24 RX ADMIN — Medication 30 MILLILITER(S): at 18:39

## 2024-07-24 RX ADMIN — Medication 1000 MILLIGRAM(S): at 22:00

## 2024-07-24 RX ADMIN — Medication 30 MILLILITER(S): at 11:59

## 2024-07-24 RX ADMIN — Medication 800 MILLILITER(S): at 19:00

## 2024-07-24 RX ADMIN — Medication 30 MILLIGRAM(S): at 00:48

## 2024-07-24 RX ADMIN — HEPARIN SODIUM 5000 UNIT(S): 1000 INJECTION, SOLUTION INTRAVENOUS; SUBCUTANEOUS at 21:05

## 2024-07-24 RX ADMIN — Medication 30 MILLIGRAM(S): at 17:34

## 2024-07-24 RX ADMIN — Medication 30 MILLIGRAM(S): at 06:11

## 2024-07-24 RX ADMIN — Medication 400 MILLIGRAM(S): at 08:53

## 2024-07-24 RX ADMIN — Medication 30 MILLIGRAM(S): at 05:11

## 2024-07-24 RX ADMIN — Medication 400 MILLIGRAM(S): at 21:05

## 2024-07-24 RX ADMIN — Medication 1 SPRAY(S): at 17:34

## 2024-07-24 RX ADMIN — Medication 30 MILLILITER(S): at 22:30

## 2024-07-24 RX ADMIN — Medication 30 MILLIGRAM(S): at 11:51

## 2024-07-24 RX ADMIN — Medication 30 MILLILITER(S): at 17:35

## 2024-07-24 RX ADMIN — Medication 30 MILLIGRAM(S): at 23:41

## 2024-07-24 NOTE — PROGRESS NOTE ADULT - ASSESSMENT
28F h/o HTN not on meds, presents with 2 weeks of diarrhea and diffuse abdominal cramps. Found to have 15cm adnexal mass compressing her small bowel leading to small bowel dilation and obstruction.   Now POD1 L oopherectomy, with general surgery assistance for small lysis of adhesions and serosal injury repair.   Patient reporting moderate pain. Otherwise HDS. Not passing gas or BM yet.     Plan  - keep NGT  - NPO  - monitor abdominal exam  - monitor bowel function  - general surgery will continue to follow  - remainder of care per OBGYN primary team appreciated    [facility], [id number]

## 2024-07-24 NOTE — DISCHARGE NOTE PROVIDER - NSDCFUADDINST_GEN_ALL_CORE_FT
Discharge Instructions:  1. Diet: advance as tolerated  2. Activity limited by:   - No running, heavy lifting, or strenuous exercise  - Nothing in vagina (bath, swim, intercourse, douching, tampons) for 4-6 weeks  3. Medications:   - Senna to prevent constipation (please hold for loose stools)  - Ibuprofen 600mg every 6 hours as needed for pain  - Acetaminophen 975 mg every 6 hours as needed for pain  - Additional Oxycodone 5mg every 6 hours as needed for severe pain   4. Follow-up appointment -2 weeks. Please call the office upon discharge to schedule your appointment if you do not have one already.   5. Precautions:  - Call the office or go to the ED if you have any of the followin) Fever >100.4 that does not resolve  2) Intractable pain  3) Heavy bleeding     Discharge Instructions:  1. Diet: advance as tolerated  2. Activity limited by:   - No running, heavy lifting, or strenuous exercise  - Nothing in vagina (bath, swim, intercourse, douching, tampons) for 4-6 weeks  3. Medications:   - Senna to prevent constipation (please hold for loose stools)  - Ibuprofen 600mg every 6 hours as needed for pain  - Acetaminophen 975 mg every 6 hours as needed for pain  - Additional Oxycodone 5mg every 6 hours as needed for severe pain   4. Follow-up appointment -2 weeks. Please call the office upon discharge to schedule your appointment if you do not have one already.   5. Precautions:  - Call the office or go to the ED if you have any of the followin) Fever >100.4 that does not resolve  2) Intractable pain  3) Heavy bleeding

## 2024-07-24 NOTE — PROGRESS NOTE ADULT - ATTENDING COMMENTS
DATE OF SERVICE: 07-24-24 @ 14:47    Doing well, pain controlled  Abd soft mildly distended, ngt bilious    Continue NGT for now  OOB  await bowel fx

## 2024-07-24 NOTE — DISCHARGE NOTE PROVIDER - CARE PROVIDER_API CALL
[facility],   [address]  [address]  [place] [address]  Phone: [phone]  Fax: (   )    -  Follow Up Time:    [facility],   [address]  [address]  [place] [address]  Phone: [phone]  Fax: (   )    -  Follow Up Time: 2 weeks

## 2024-07-24 NOTE — DISCHARGE NOTE PROVIDER - NSDCFUADDAPPT_GEN_ALL_CORE_FT
Please call to schedule an appointment with the [facility] Please call to schedule an appointment with the [facility]    Please follow up with your PCP regarding your hypertension.

## 2024-07-24 NOTE — PROGRESS NOTE ADULT - ASSESSMENT
Pain Management Attending Addendum    SUBJECTIVE: Patient doing well with IV PCA    Therapy:    [X] IV PCA         [ ] PRN Analgesics    OBJECTIVE:   [X] Pain appropriately controlled    [ ] Other:    Side Effects:  [X] None	             [ ] Nausea              [ ] Pruritis                	[ ] Other:    ASSESSMENT/PLAN: Continue current therapy    :

## 2024-07-24 NOTE — DISCHARGE NOTE PROVIDER - NSDCMRMEDTOKEN_GEN_ALL_CORE_FT
acetaminophen 325 mg oral tablet: 3 tab(s) orally every 6 hours  ibuprofen 600 mg oral tablet: 1 tab(s) orally every 6 hours   acetaminophen 325 mg oral tablet: 3 tab(s) orally every 6 hours  ibuprofen 600 mg oral tablet: 1 tab(s) orally every 6 hours  oxyCODONE 5 mg oral tablet: 1 tab(s) orally every 6 hours MDD: 4

## 2024-07-24 NOTE — DISCHARGE NOTE PROVIDER - ATTENDING DISCHARGE PHYSICAL EXAMINATION:
Agree with above note. Patient initially presented with abdominal pain, n/v from SBO and ovarian torsion caused by large adnexal mass. Pt underwent ex lap, ovarian cystectomy, and repair of bowel serosa. Overall uncomplicated. NGT placed intraoperatively and remained in place until return of bowel function. Once the NGT was removed, pt's diet was advanced until tolerating regular diet. At the time of DC, pt meeting all post operative milestones- voiding, defecating, ambulating, tolerating PO, pain well controlled on PO meds.    Eda Patel MD

## 2024-07-24 NOTE — PROGRESS NOTE ADULT - SUBJECTIVE AND OBJECTIVE BOX
R4 Gyn Progress Note    Patient seen and examined at bedside. No acute overnight events. No acute complaints, pain controlled with PCA pump.  Whitaker in place, NPO. Denies CP, SOB, N/V, fevers, and chills.    Vital Signs Last 24 Hours  T(C): 36.9 (07-24-24 @ 05:22), Max: 37.2 (07-23-24 @ 20:54)  HR: 100 (07-23-24 @ 23:42) (90 - 109)  BP: 122/84 (07-23-24 @ 23:42) (114/75 - 147/76)  RR: 18 (07-24-24 @ 05:22) (16 - 18)  SpO2: 100% (07-24-24 @ 05:22) (96% - 100%)    I&O's Summary    23 Jul 2024 07:01  -  24 Jul 2024 06:39  --------------------------------------------------------  IN: 250 mL / OUT: 815 mL / NET: -565 mL    Physical Exam:  General: NAD  CV: regular rate and rhythm  Lungs: clear to auscultation bilaterally  Abdomen: Soft, non-tender, non-distended, +BS  Incision: Midline vertical incision w/ Dermabond prineo C/D/I  Ext: No pain or swelling  Drains: whitaker draining clear yellow urine, NG tube with continued bilious output    Labs:             10.4   14.39 )-----------( 615      ( 23 Jul 2024 04:32 )             31.7   baso 0.1    eos 0.2    imm gran 0.5    lymph 12.2   mono 5.9    poly 81.1                         10.8   13.14 )-----------( 604      ( 22 Jul 2024 11:07 )             32.6   baso 0.2    eos 0.5    imm gran 0.6    lymph 14.4   mono 7.5    poly 76.8     MEDICATIONS  (STANDING):  acetaminophen   IVPB .. 1000 milliGRAM(s) IV Intermittent every 8 hours  heparin   Injectable 5000 Unit(s) SubCutaneous every 8 hours  HYDROmorphone PCA (1 mG/mL) 30 milliLiter(s) PCA Continuous PCA Continuous  ketorolac   Injectable 30 milliGRAM(s) IV Push every 6 hours  lactated ringers. 1000 milliLiter(s) (125 mL/Hr) IV Continuous <Continuous>    MEDICATIONS  (PRN):  HYDROmorphone PCA (1 mG/mL) Rescue Clinician Bolus 0.5 milliGRAM(s) IV Push every 15 minutes PRN For Pain Scale greater than 6  naloxone Injectable 0.1 milliGRAM(s) IV Push every 3 minutes PRN For ANY of the following changes in patient status:  A. RR LESS THAN 10 breaths per minute, B. Oxygen saturation LESS THAN 90%, C. Sedation score of 6  ondansetron Injectable 4 milliGRAM(s) IV Push every 6 hours PRN Nausea

## 2024-07-24 NOTE — DISCHARGE NOTE PROVIDER - HOSPITAL COURSE
Patient underwent an exploratory laparotomy, left salpingoophorectomy, right ovarian cystectomy, repair of bowel serosa (EBL 50).  POD#0 Pain was well controlled with PCA pump and patient remained NPO with an NG tube in place.      POD#1 No acute events overnight. Patient was advanced to a regular diet. Madison was discontinued and patient voided spontaneously. Patient was transitioned to oral analgesics and pain was well controlled.   Upon discharge on POD# , the patient is ambulating, voiding spontaneously, tolerating oral intake, pain was well controlled with oral medication, and vital signs were stable.   Patient to have close follow up with _. Patient underwent an exploratory laparotomy, left salpingoophorectomy, right ovarian cystectomy, repair of bowel serosa (EBL 50).  POD#0 Pain was well controlled with PCA pump and patient remained NPO with an NG tube in place.  POD#1 PCA was discontinued and patient had pain control with IV pain medications. Remained NPO with NG tube in place.  POD#2 Patient passed flatus overnight. Clamp trial for NGT was performed.    POD#1 No acute events overnight. Patient was advanced to a regular diet. Madison was discontinued and patient voided spontaneously. Patient was transitioned to oral analgesics and pain was well controlled.   Upon discharge on POD# , the patient is ambulating, voiding spontaneously, tolerating oral intake, pain was well controlled with oral medication, and vital signs were stable.   Patient to have close follow up with _. Patient presented with x2 weeks of diffuse abdominal pain and found to have large pelvic mass as well as radiographic SBO. Patient underwent an exploratory laparotomy, left salpingooophorectomy, right ovarian cystectomy, repair of bowel serosa (EBL 50). Case was performed with assistance of general surgery  POD#0 Pain was controlled with PCA pump and patient remained NPO with an NGT tube in place.  POD#1 PCA was discontinued and patient had pain control with IV pain medications. Patient remained NPO with NG tube in place. Madison was discontinued and patient voided  POD#2 Patient continued to undergo routine post-op care  POD#3 Patient began to have return of bowel function. NGT was removed and patient was advanced to a CLD    Upon discharge on POD#4, the patient was ambulating, voiding spontaneously, tolerating oral intake, had pain was well controlled with oral medication, and vital signs were stable.   Patient to have close follow up with the [address] [place] [address] [facility] Patient presented with x2 weeks of diffuse abdominal pain and found to have large pelvic mass as well as radiographic SBO. Patient underwent an exploratory laparotomy, left salpingooophorectomy, right ovarian cystectomy, repair of bowel serosa (EBL 50). Case was performed with assistance of general surgery  POD#0 Pain was controlled with PCA pump and patient remained NPO with an NGT tube in place.  POD#1 PCA was discontinued and patient had pain control with IV pain medications. Patient remained NPO with NG tube in place. Madison was discontinued and patient voided  POD#2 Patient continued to undergo routine post-op care  POD#3 Patient began to have return of bowel function. NGT was removed and patient was advanced to a CLD.  POD#4 Patient advanced to regular diet. Patient tolerated well with continued bowel function.  POD#5 Patient continued to meet post-operative milestones. Patient was seen by hospitalist regarding hypertension. Per recommendations, blood pressure likely pain mediated, patient does not require medications and can follow up outpatient with her PCP.    Upon discharge on POD#5, the patient was ambulating, voiding spontaneously, tolerating oral intake, had pain was well controlled with oral medication, and vital signs were stable.   Patient to have close follow up with the [address] [place] [address] Ob/Gyn Clinic

## 2024-07-24 NOTE — PROGRESS NOTE ADULT - SUBJECTIVE AND OBJECTIVE BOX
Day 1 of Anesthesia Pain Management Service    SUBJECTIVE: I'm doing  a little better    Pain Scale Score:	[X] Refer to charted pain scores    THERAPY:    [ ] IV PCA Morphine		[ ] 5 mg/mL	[ ] 1 mg/mL  [X] IV PCA Hydromorphone	[ ] 5 mg/mL	[X] 1 mg/mL  [ ] IV PCA Fentanyl		[ ] 50 micrograms/mL    Demand dose: 0.3 mg     Lockout: 6 minutes   Continuous Rate: 0 mg/hr  4 Hour Limit: 6 mg    MEDICATIONS  (STANDING):  acetaminophen   IVPB .. 1000 milliGRAM(s) IV Intermittent every 8 hours  heparin   Injectable 5000 Unit(s) SubCutaneous every 8 hours  HYDROmorphone PCA (1 mG/mL) 30 milliLiter(s) PCA Continuous PCA Continuous  ketorolac   Injectable 30 milliGRAM(s) IV Push every 6 hours  lactated ringers. 1000 milliLiter(s) (125 mL/Hr) IV Continuous <Continuous>    MEDICATIONS  (PRN):  HYDROmorphone PCA (1 mG/mL) Rescue Clinician Bolus 0.5 milliGRAM(s) IV Push every 15 minutes PRN For Pain Scale greater than 6  naloxone Injectable 0.1 milliGRAM(s) IV Push every 3 minutes PRN For ANY of the following changes in patient status:  A. RR LESS THAN 10 breaths per minute, B. Oxygen saturation LESS THAN 90%, C. Sedation score of 6  ondansetron Injectable 4 milliGRAM(s) IV Push every 6 hours PRN Nausea      OBJECTIVE:    Sedation Score:	[ X] Alert 	[ ] Drowsy 	[ ] Arousable	[ ] Asleep	[ ] Unresponsive    Side Effects:	[X ] None	[ ] Nausea	[ ] Vomiting	[ ] Pruritus  		[ ] Other:    Vital Signs Last 24 Hrs  T(C): 36.6 (24 Jul 2024 08:25), Max: 37.2 (23 Jul 2024 20:54)  T(F): 97.8 (24 Jul 2024 08:25), Max: 98.9 (23 Jul 2024 20:54)  HR: 105 (24 Jul 2024 08:25) (90 - 109)  BP: 113/68 (24 Jul 2024 08:25) (113/68 - 147/76)  BP(mean): 102 (23 Jul 2024 17:30) (86 - 102)  RR: 18 (24 Jul 2024 08:25) (16 - 18)  SpO2: 98% (24 Jul 2024 08:25) (96% - 100%)    Parameters below as of 24 Jul 2024 08:25  Patient On (Oxygen Delivery Method): room air        ASSESSMENT/ PLAN    Therapy to  be:               [X] Continued   [ ] Discontinued   [ ] Changed to PRN Analgesics    Documentation and Verification of current medications:   [X] Done	[ ] Not done, not eligible    Comments: +NGT Endorsed improved pain with increased PCA dosing. Total use 14.6mg / 17 hours, using 0-4x/hr.   Reeducated to use.

## 2024-07-24 NOTE — DISCHARGE NOTE PROVIDER - NSDCCPTREATMENT_GEN_ALL_CORE_FT
PRINCIPAL PROCEDURE  Procedure: Laparotomy with left salpingo-oophorectomy  Findings and Treatment:       SECONDARY PROCEDURE  Procedure: Repair of serosal tear of small intestine  Findings and Treatment:     Procedure: Open right ovarian cystectomy  Findings and Treatment:

## 2024-07-24 NOTE — PROGRESS NOTE ADULT - ATTENDING COMMENTS
GYN Attending of the Week Addendum:    I agree with the above resident's note. 28y POD#1 s/p ex-lap, LSO, R ovarian cystectomy, repair of bowel serosa (EBL 50, intra-op frozen path benign) for ovarian torsion and SBO. Patient doing well post operative. Pain well controlled on PCA. NPO while NG tube in place. Ambulating. Whitaker remains in place. Labs reviewed, VSS. Incision c/d/i, abdominal exam appropriate. Plan to remove whitaker and complete TOV. Please remove NGT once return of bowel function occurs. Continue SCDs and HSQ for DVT ppx while inpatient.    Eda Patel MD

## 2024-07-24 NOTE — DISCHARGE NOTE PROVIDER - NSDCCPCAREPLAN_GEN_ALL_CORE_FT
PRINCIPAL DISCHARGE DIAGNOSIS  Diagnosis: Ovarian torsion  Assessment and Plan of Treatment:       SECONDARY DISCHARGE DIAGNOSES  Diagnosis: Acute UTI  Assessment and Plan of Treatment:     Diagnosis: Small bowel obstruction  Assessment and Plan of Treatment:

## 2024-07-24 NOTE — PROGRESS NOTE ADULT - ASSESSMENT
A/P:   28y POD#1 s/p ex-lap LSO, R ovarian cystectomy, repair of bowel serosa (EBL 50, intra-op frozen path benign) for concern of ovarian torsion and SBO. Patient recovering appropriately post-operatively.    Neuro: C/w PCA, Tylenol, Toradol.  CV: Hemodynamically stable. f/u AM labs.  Pulm: Saturating well on room air, IS at bedside.  GI: NPO w/ NGT in place; replete NG output PRN.  - Plan for NGT until return of bowel function  : [place] in place with adequate urine output overnight.  Heme: c/w HSQ and SCDs for DVT ppx.  Dispo: Continue routine post-op care.    Tami Leon  PGY-4

## 2024-07-24 NOTE — DISCHARGE NOTE PROVIDER - PROVIDER TOKENS
FREE:[LAST:[[facility]],PHONE:[phone]],FAX:[(   )    -],ADDRESS:[[address] [place] [address]]] FREE:[LAST:[[facility]],PHONE:[phone]],FAX:[(   )    -],ADDRESS:[[address] [place] [address]],FOLLOWUP:[2 weeks]]

## 2024-07-25 LAB
HCT VFR BLD CALC: 27.4 % — LOW (ref 34.5–45)
HCT VFR BLD CALC: 27.4 % — LOW (ref 34.5–45)
HGB BLD-MCNC: 8.3 G/DL — LOW (ref 11.5–15.5)
HGB BLD-MCNC: 8.7 G/DL — LOW (ref 11.5–15.5)
MCHC RBC-ENTMCNC: 25.8 PG — LOW (ref 27–34)
MCHC RBC-ENTMCNC: 26.9 PG — LOW (ref 27–34)
MCHC RBC-ENTMCNC: 30.3 GM/DL — LOW (ref 32–36)
MCHC RBC-ENTMCNC: 31.8 GM/DL — LOW (ref 32–36)
MCV RBC AUTO: 84.6 FL — SIGNIFICANT CHANGE UP (ref 80–100)
MCV RBC AUTO: 85.1 FL — SIGNIFICANT CHANGE UP (ref 80–100)
NON-GYNECOLOGICAL CYTOLOGY STUDY: SIGNIFICANT CHANGE UP
NRBC # BLD: 0 /100 WBCS — SIGNIFICANT CHANGE UP (ref 0–0)
NRBC # BLD: 0 /100 WBCS — SIGNIFICANT CHANGE UP (ref 0–0)
PLATELET # BLD AUTO: 552 K/UL — HIGH (ref 150–400)
PLATELET # BLD AUTO: 564 K/UL — HIGH (ref 150–400)
RBC # BLD: 3.22 M/UL — LOW (ref 3.8–5.2)
RBC # BLD: 3.24 M/UL — LOW (ref 3.8–5.2)
RBC # FLD: 15.5 % — HIGH (ref 10.3–14.5)
RBC # FLD: 15.6 % — HIGH (ref 10.3–14.5)
WBC # BLD: 13.04 K/UL — HIGH (ref 3.8–10.5)
WBC # BLD: 13.19 K/UL — HIGH (ref 3.8–10.5)
WBC # FLD AUTO: 13.04 K/UL — HIGH (ref 3.8–10.5)
WBC # FLD AUTO: 13.19 K/UL — HIGH (ref 3.8–10.5)

## 2024-07-25 RX ORDER — BACTERIOSTATIC SODIUM CHLORIDE 0.9 %
700 VIAL (ML) INJECTION ONCE
Refills: 0 | Status: COMPLETED | OUTPATIENT
Start: 2024-07-25 | End: 2024-07-25

## 2024-07-25 RX ORDER — DEXTROSE MONOHYDRATE, SODIUM CHLORIDE, SODIUM LACTATE, CALCIUM CHLORIDE, MAGNESIUM CHLORIDE 1.5; 538; 448; 18.4; 5.08 G/100ML; MG/100ML; MG/100ML; MG/100ML; MG/100ML
250 SOLUTION INTRAPERITONEAL ONCE
Refills: 0 | Status: COMPLETED | OUTPATIENT
Start: 2024-07-25 | End: 2024-07-25

## 2024-07-25 RX ORDER — HYDROMORPHONE HCL IN 0.9% NACL 0.2 MG/ML
0.5 PLASTIC BAG, INJECTION (ML) INTRAVENOUS EVERY 4 HOURS
Refills: 0 | Status: DISCONTINUED | OUTPATIENT
Start: 2024-07-25 | End: 2024-07-27

## 2024-07-25 RX ORDER — ACETAMINOPHEN 500 MG
1000 TABLET ORAL EVERY 6 HOURS
Refills: 0 | Status: COMPLETED | OUTPATIENT
Start: 2024-07-25 | End: 2024-07-26

## 2024-07-25 RX ADMIN — Medication 1000 MILLIGRAM(S): at 06:00

## 2024-07-25 RX ADMIN — HEPARIN SODIUM 5000 UNIT(S): 1000 INJECTION, SOLUTION INTRAVENOUS; SUBCUTANEOUS at 14:16

## 2024-07-25 RX ADMIN — Medication 30 MILLIGRAM(S): at 00:42

## 2024-07-25 RX ADMIN — Medication 400 MILLIGRAM(S): at 12:08

## 2024-07-25 RX ADMIN — DEXTROSE MONOHYDRATE, SODIUM CHLORIDE, SODIUM LACTATE, CALCIUM CHLORIDE, MAGNESIUM CHLORIDE 125 MILLILITER(S): 1.5; 538; 448; 18.4; 5.08 SOLUTION INTRAPERITONEAL at 10:21

## 2024-07-25 RX ADMIN — Medication 700 MILLILITER(S): at 18:00

## 2024-07-25 RX ADMIN — Medication 30 MILLIGRAM(S): at 10:00

## 2024-07-25 RX ADMIN — Medication 400 MILLIGRAM(S): at 05:15

## 2024-07-25 RX ADMIN — Medication 30 MILLIGRAM(S): at 17:17

## 2024-07-25 RX ADMIN — Medication 400 MILLIGRAM(S): at 22:31

## 2024-07-25 RX ADMIN — DEXTROSE MONOHYDRATE, SODIUM CHLORIDE, SODIUM LACTATE, CALCIUM CHLORIDE, MAGNESIUM CHLORIDE 1000 MILLILITER(S): 1.5; 538; 448; 18.4; 5.08 SOLUTION INTRAPERITONEAL at 05:57

## 2024-07-25 RX ADMIN — Medication 30 MILLIGRAM(S): at 09:18

## 2024-07-25 RX ADMIN — Medication 30 MILLILITER(S): at 07:28

## 2024-07-25 RX ADMIN — HEPARIN SODIUM 5000 UNIT(S): 1000 INJECTION, SOLUTION INTRAVENOUS; SUBCUTANEOUS at 05:16

## 2024-07-25 RX ADMIN — Medication 1000 MILLIGRAM(S): at 23:33

## 2024-07-25 RX ADMIN — Medication 1400 MILLILITER(S): at 17:47

## 2024-07-25 RX ADMIN — Medication 1000 MILLIGRAM(S): at 13:00

## 2024-07-25 NOTE — PROGRESS NOTE ADULT - SUBJECTIVE AND OBJECTIVE BOX
Day 2 of Anesthesia Pain Management Service    SUBJECTIVE: Doing ok    Pain Scale Score:	[X] Refer to charted pain scores    THERAPY:    [ ] IV PCA Morphine		        [ ] 5 mg/mL	[ ] 1 mg/mL  [X] IV PCA Hydromorphone	[ ] 5 mg/mL	[X] 1 mg/mL  [ ] IV PCA Fentanyl		        [ ] 50 micrograms/mL    Demand dose: 0.3 mg     Lockout: 6 minutes   Continuous Rate: 0 mg/hr  4 Hour Limit: 6 mg    MEDICATIONS  (STANDING):  acetaminophen   IVPB .. 1000 milliGRAM(s) IV Intermittent every 6 hours  heparin   Injectable 5000 Unit(s) SubCutaneous every 8 hours  ketorolac   Injectable 30 milliGRAM(s) IV Push every 6 hours  lactated ringers. 1000 milliLiter(s) (125 mL/Hr) IV Continuous <Continuous>  sodium chloride 0.9% Bolus 400 milliLiter(s) IV Bolus once    MEDICATIONS  (PRN):  HYDROmorphone  Injectable 0.5 milliGRAM(s) IV Push every 4 hours PRN Pain  naloxone Injectable 0.1 milliGRAM(s) IV Push every 3 minutes PRN For ANY of the following changes in patient status:  A. RR LESS THAN 10 breaths per minute, B. Oxygen saturation LESS THAN 90%, C. Sedation score of 6  ondansetron Injectable 4 milliGRAM(s) IV Push every 6 hours PRN Nausea      OBJECTIVE:    Sedation Score:	[ X] Alert	 [ ] Drowsy 	[ ] Arousable	[ ] Asleep	[ ] Unresponsive    Side Effects:	[X ] None	[ ] Nausea	[ ] Vomiting	[ ] Pruritus  		[ ] Other:    Vital Signs Last 24 Hrs  T(C): 36.8 (25 Jul 2024 08:35), Max: 36.8 (24 Jul 2024 17:49)  T(F): 98.2 (25 Jul 2024 08:35), Max: 98.2 (24 Jul 2024 17:49)  HR: 93 (25 Jul 2024 08:35) (93 - 105)  BP: 131/90 (25 Jul 2024 08:35) (124/84 - 134/91)  BP(mean): --  RR: 18 (25 Jul 2024 08:35) (18 - 18)  SpO2: 98% (25 Jul 2024 08:35) (96% - 99%)    Parameters below as of 25 Jul 2024 08:35  Patient On (Oxygen Delivery Method): room air        ASSESSMENT/ PLAN    Therapy to  be:               [  ] Continued   [X ] Discontinued   [ X] Changed to PRN Analgesics    Documentation and Verification of current medications:   [X] Done	[ ] Not done, not eligible    Comments: OOB in chair. PCA D\C'd by primary service transitioned to prn analgesics.

## 2024-07-25 NOTE — CHART NOTE - NSCHARTNOTEFT_GEN_A_CORE
Post Operative Note  Patient: MADELINE INFANTE 28y (1995) Female   MRN: [id number]  Location: [facility] [id number] W1  Visit: 07-22-24 Inpatient  Date: 07-23-24 @ 21:52    Procedure: S/P bowel walk and SOFIA done following adnexal mass excision (by GYN)    Subjective: Patient seen and examined post operatively. Reports pain as controlled. Denies nausea, vomiting, fever, chills, chest pain, SOB, cough.      Objective:  Vitals: T(F): 98.9 (07-23-24 @ 20:54), Max: 98.9 (07-23-24 @ 20:54)  HR: 109 (07-23-24 @ 20:54)  BP: 115/77 (07-23-24 @ 20:54) (114/75 - 147/76)  RR: 18 (07-23-24 @ 20:54)  SpO2: 98% (07-23-24 @ 20:54)      Physical Examination:  General: NAD, resting comfortably in bed  HEENT: Normocephalic atraumatic  Respiratory: Nonlabored respirations, normal CW expansion.  Cardio: S1S2, regular rate and rhythm.  Abdomen: softly distended, appropriately tender, surgical incisions are c/d/i. NGT present and draining  Vascular: extremities are warm and well perfused.     Imaging:  No post-op imaging studies    Assessment:  28yFemale patient S/P bowel walk and SOFIA done following adnexal mass excision (by GYN)    Plan:  - IV Abx:   - Pain: acetaminophen   IVPB ..; HYDROmorphone PCA (1 mG/mL); HYDROmorphone PCA (1 mG/mL) Rescue Clinician Bolus PRN; ketorolac   Injectable; ondansetron Injectable PRN  - Diet: NPO  - IVF: Maintainance fluid  - DVT ppx: SCD's & heparin   Injectable 5000 every 8 hours    Date/Time: 07-23-24 @ 21:52
Consulted by Gyn for oncological input for 29 yo with 21 cm multiloculated cyst in setting of surgical management for r/o torsion  Discussed findings with R2    In brief, this is a 29 yo P1 p/w abd pain, found to have 21cm multiloculated cystic lesion arising from L adnexa on CT imaging. Arterial and venous waveforms not well visualized. Also noted to have SBO with transition point at level of adnexal mass, although pt not clinically obstructed. Per surgery, no surgical intervention  WBC 13, lactate 1.4. Pt afebrile. Per R2, abd benign.     No evidence of gross disease to support ovarian cancer diagnosis at this time. Tumor markers pending.  If prompt surgical intervention of ovarian torsion indicated based on clinical impression by primary team, at time of surgery would recommend pelvic washings and attempt at intact removal of ovary in bag. If gross or disseminated disease, would recommend biopsy for permanent section.     If plan per Gyn to proceed to OR for surgical management of torsion, would recommend pelvic washings, removing specimen intact in bag, and sending to pathology for permanent section. Given concern for torsion, not advisable to proceed with primary ovarian cancer staging if malignancy is encountered as this involves thorough discussion with pt regarding fertility sparing management vs preservation of contralateral ovary. Such counseling not advised when pt in pain and plan for class I procedure.   Pt should be advised that second surgery may be indicated if invasive carcinoma is identified based on pt's final pathology and fertility desires after initial surgery performed.    D/w Dr. Montesinos, attending  Yoko Oviedo MD
Gyn Onc Fellow     Patient seen at bedside approx 08:30 this morning. Pain well controlled overnight with tylenol, toradol, and morphine. Pain mostly located in her back at this time. Felt nauseated when she woke up this morning but no further episodes of emesis. Reiterates that symptoms of bloating and abdominal discomfort have increased over the last 2 weeks or so.     Vitals, Labs, Imaging reviewed    CT, TVUS notable for approx 16cm cyst arising likely from left adnexa  Imaging and exam findings concerning for torsion   Recommend operative management for cystectomy, possible oophorectomy  Discussed with patient and her mother possibility of ovarian malignancy and possible fertility-sparing staging if indicated  Discussed with Gynecology team, Gyn Onc remains available for intraoperative assistance as needed    D/w Dr. Tatiana Ravi MD PGY7
R4 Gyn Post Op Check    Patient seen and examined at bedside. No acute complaints. Pain well controlled.  NPO with NGT and whitaker in place. Denies CP, SOB, N/V, fevers, and chills.    Vital Signs Last 24 Hours  T(C): 36.7 (07-23-24 @ 15:05), Max: 36.8 (07-22-24 @ 19:25)  HR: 95 (07-23-24 @ 17:15) (90 - 112)  BP: 139/76 (07-23-24 @ 17:15) (128/63 - 153/91)  RR: 16 (07-23-24 @ 17:15) (16 - 18)  SpO2: 96% (07-23-24 @ 17:15) (96% - 99%)    I&O's Summary    23 Jul 2024 07:01  -  23 Jul 2024 17:39  --------------------------------------------------------  IN: 250 mL / OUT: 165 mL / NET: 85 mL    Physical Exam:  General: NAD  Cardio: regular rate and rhythm, no murmurs, rubs, or gallops  Lungs: clear to auscultation bilaterally  Abdomen: Soft, non-distended, appropriately tender  Incision: midline vertical incision w/ Dermabond CDI  Ext: No pain or swelling  Drains: NGT with minimal bilious fluid, whitaker catheter with yellow urine    Labs:             10.4   14.39 )-----------( 615      ( 07-23 @ 04:32 )             31.7                10.8   13.14 )-----------( 604      ( 07-22 @ 11:07 )             32.6     MEDICATIONS  (STANDING):  acetaminophen   IVPB .. 1000 milliGRAM(s) IV Intermittent every 8 hours  heparin   Injectable 5000 Unit(s) SubCutaneous every 8 hours  HYDROmorphone PCA (1 mG/mL) 30 milliLiter(s) PCA Continuous PCA Continuous  ketorolac   Injectable 30 milliGRAM(s) IV Push every 6 hours  lactated ringers. 1000 milliLiter(s) (125 mL/Hr) IV Continuous <Continuous>    MEDICATIONS  (PRN):  naloxone Injectable 0.1 milliGRAM(s) IV Push every 3 minutes PRN For ANY of the following changes in patient status:  A. RR LESS THAN 10 breaths per minute, B. Oxygen saturation LESS THAN 90%, C. Sedation score of 6  ondansetron Injectable 4 milliGRAM(s) IV Push every 6 hours PRN Nausea    29 y/o s/p ex-lap, LSO, R ovarian cystectomy, repair of bowel serosa by gen surg (EBL 50). Recovering appropriately post-operatively.    Neuro: C/w PCA, IV Tylenol, Toradol for pain control.  CV: Hemodynamically stable.  Pulm: Saturating well on room air, encourage incentive spirometry  GI: NPO w/ NGT in place.  : UOP adequate, monitor overnight, whitaker catheter in place.  Heme: Continue HSQ and SCDs for DVT prophylaxis    d/w Gyn team  Tami Leon  PGY4
R4 OR Note    Patient seen at bedside this AM by GYN oncology and GYN.    Plan for surgery given large cyst and concern for pain either from mass effect of large ovarian cyst or potential ovarian torsion. Tumor markers and imaging re-reviewed this AM, likely benign pathology.    Patient added on to OR emergently for pelvic exam under anesthesia, exploratory laparotomy, L ovarian cystectomy, possible L salpingoophorectomy, possible staging. Plan to send frozen pathology intra-op and GYN Oncology on backup for intra-op assistance if need for staging.    Patient aware of plan for laparotomy, amenable. All questions answered. Continues to be NPO.    Tami Leon  PGY4
R4 PM Round Note    Saw patient at bedside for PM rounding. Sitting up in chair, feeling well. Denies flatus but feels her stomach and bowels rumbling more. Chewing gum and has walked multipel laps around the unit today.    Vital Signs Last 24 Hrs  T(C): 36.7 (25 Jul 2024 12:52), Max: 36.8 (24 Jul 2024 17:49)  T(F): 98 (25 Jul 2024 12:52), Max: 98.2 (24 Jul 2024 17:49)  HR: 101 (25 Jul 2024 12:52) (93 - 105)  BP: 147/90 (25 Jul 2024 14:00) (124/84 - 147/90)  RR: 18 (25 Jul 2024 12:52) (18 - 18)  SpO2: 98% (25 Jul 2024 12:52) (96% - 99%)    - Continue w/ NGT given lack of flatus and amount of output  - Continue to monitor  - IV Tylenol, Toradol, Dilaudid PRN for pain    Tami Leon  PGY4

## 2024-07-25 NOTE — PROGRESS NOTE ADULT - ASSESSMENT
Assessment/Plan: 28y POD#2, s/p ex-lap LSO, R ovarian cystectomy, repair of bowel serosa (EBL 50, intra-op frozen path benign) for concern of ovarian torsion and SBO. Patient recovering appropriately post-operatively.    Neuro: C/w PCA, Tylenol, Toradol.  CV: Hemodynamically stable. 7/24 AM labs H/H 10.5/32.6, f/u AM labs   Pulm: Saturating well on room air, IS at bedside.  GI: NPO w/ NGT in place; replete NG output PRN.  - Plan for NGT until return of bowel function  : Voiding spontaneously.  Heme: c/w HSQ and SCDs for DVT ppx.  Dispo: Continue routine post-op care.    Ashley Hampton MD  PGY-1 Assessment/Plan: 27yo POD#2, s/p ex-lap LSO, R ovarian cystectomy, and repair of bowel serosa w/ general surgery (EBL 50, intra-op frozen path benign) for concerns of ovarian torsion in the setting of large pelvic mass and SBO on CT. Patient is progressing post-operatively, albeit without return of bowel function.    Neuro: C/w PCA, Tylenol, Toradol.  - Will consider dc of PCA and transition to solely IV analgesia regimen due to NGT  CV: Hemodynamically stable. 7/24 AM labs H/H 10.5/32.6, f/u AM labs   Pulm: Saturating well on room air. For incentive spirometer use   GI: NPO w/ NGT in place; replete NGT output PRN.  - Plan for NGT until return of bowel function  - Appreciate general surgery input   : Voiding spontaneously  FEN: LR@125. Replete electrolytes PRN  Heme: c/w HSQ and SCDs for DVT ppx.  Dispo: Continue routine post-op care.    Ashley Hampton MD, PGY-1  Diogenes Fleming, PGY-3  Obstetrics & Gynecology

## 2024-07-25 NOTE — PROGRESS NOTE ADULT - SUBJECTIVE AND OBJECTIVE BOX
Gyn Progress Note POD#  HD#    Subjective:   Pt seen and examined at bedside. No events overnight. Pain well controlled on PCA. Patient ambulating, Not yet passing flatus. NPO. Pt denies fever, chills, chest pain, SOB, nausea, vomiting, lightheadedness, dizziness.      Objective:  T(F): 98.1 (07-25-24 @ 06:00), Max: 98.2 (07-24-24 @ 17:49)  HR: 97 (07-25-24 @ 06:00) (96 - 105)  BP: 134/91 (07-25-24 @ 06:00) (113/68 - 139/89)  RR: 18 (07-25-24 @ 06:00) (18 - 18)  SpO2: 99% (07-25-24 @ 06:00) (96% - 100%)  Wt(kg): --  I&O's Summary    23 Jul 2024 07:01  -  24 Jul 2024 07:00  --------------------------------------------------------  IN: 250 mL / OUT: 815 mL / NET: -565 mL    24 Jul 2024 07:01  -  25 Jul 2024 06:26  --------------------------------------------------------  IN: 400 mL / OUT: 2050 mL / NET: -1650 mL        MEDICATIONS  (STANDING):  acetaminophen   IVPB .. 1000 milliGRAM(s) IV Intermittent every 6 hours  heparin   Injectable 5000 Unit(s) SubCutaneous every 8 hours  HYDROmorphone PCA (1 mG/mL) 30 milliLiter(s) PCA Continuous PCA Continuous  ketorolac   Injectable 30 milliGRAM(s) IV Push every 6 hours  lactated ringers. 1000 milliLiter(s) (125 mL/Hr) IV Continuous <Continuous>  sodium chloride 0.9% Bolus 400 milliLiter(s) IV Bolus once    MEDICATIONS  (PRN):  HYDROmorphone PCA (1 mG/mL) Rescue Clinician Bolus 0.5 milliGRAM(s) IV Push every 15 minutes PRN For Pain Scale greater than 6  naloxone Injectable 0.1 milliGRAM(s) IV Push every 3 minutes PRN For ANY of the following changes in patient status:  A. RR LESS THAN 10 breaths per minute, B. Oxygen saturation LESS THAN 90%, C. Sedation score of 6  ondansetron Injectable 4 milliGRAM(s) IV Push every 6 hours PRN Nausea      Physical Exam:  Constitutional: NAD, Lying in bed   CV: Regular rate and rhythm. No murmurs appreciated   Lungs: Clear to auscultation  bilaterally. No respiratory distress  Abdomen: Soft. Appropriately tender. Non-distended. Bowel sounds present.  Drains: NGT with continued bilious output  : Voiding spontaneously  Incision: midline vertical incision with Dermabond prineo c/d/i  Extremities: No calf tenderness bilaterally    LABS:                  Assessment/Plan: 28y POD#2, s/p ex-lap LSO, R ovarian cystectomy, repair of bowel serosa (EBL 50, intra-op frozen path benign) for concern of ovarian torsion and SBO. Patient recovering appropriately post-operatively.    Neuro: C/w PCA, Tylenol, Toradol.  CV: Hemodynamically stable. 7/24 AM labs H/H 10.5/32.6, f/u AM labs   Pulm: Saturating well on room air, IS at bedside.  GI: NPO w/ NGT in place; replete NG output PRN.  - Plan for NGT until return of bowel function  : Voiding spontaneously  Heme: c/w HSQ and SCDs for DVT ppx.  Dispo: Continue routine post-op care.    Ashley Hampton MD  PGY-1           Gyn Progress Note    Subjective:   Pt seen and examined at bedside. No events overnight. Pain well controlled on PCA. Patient ambulating and voiding spontaneously, not yet passing flatus. NPO. Pt denies fever, chills, chest pain, SOB, nausea, vomiting, lightheadedness, dizziness.      Objective:  T(F): 98.1 (07-25-24 @ 06:00), Max: 98.2 (07-24-24 @ 17:49)  HR: 97 (07-25-24 @ 06:00) (96 - 105)  BP: 134/91 (07-25-24 @ 06:00) (113/68 - 139/89)  RR: 18 (07-25-24 @ 06:00) (18 - 18)  SpO2: 99% (07-25-24 @ 06:00) (96% - 100%)  Wt(kg): --  I&O's Summary    23 Jul 2024 07:01  -  24 Jul 2024 07:00  --------------------------------------------------------  IN: 250 mL / OUT: 815 mL / NET: -565 mL    24 Jul 2024 07:01  -  25 Jul 2024 06:26  --------------------------------------------------------  IN: 400 mL / OUT: 2050 mL / NET: -1650 mL    MEDICATIONS  (STANDING):  acetaminophen   IVPB .. 1000 milliGRAM(s) IV Intermittent every 6 hours  heparin   Injectable 5000 Unit(s) SubCutaneous every 8 hours  HYDROmorphone PCA (1 mG/mL) 30 milliLiter(s) PCA Continuous PCA Continuous  ketorolac   Injectable 30 milliGRAM(s) IV Push every 6 hours  lactated ringers. 1000 milliLiter(s) (125 mL/Hr) IV Continuous <Continuous>  sodium chloride 0.9% Bolus 400 milliLiter(s) IV Bolus once    MEDICATIONS  (PRN):  HYDROmorphone PCA (1 mG/mL) Rescue Clinician Bolus 0.5 milliGRAM(s) IV Push every 15 minutes PRN For Pain Scale greater than 6  naloxone Injectable 0.1 milliGRAM(s) IV Push every 3 minutes PRN For ANY of the following changes in patient status:  A. RR LESS THAN 10 breaths per minute, B. Oxygen saturation LESS THAN 90%, C. Sedation score of 6  ondansetron Injectable 4 milliGRAM(s) IV Push every 6 hours PRN Nausea    Physical Exam:  Constitutional: NAD, Lying in bed   CV: Regular rate and rhythm. No murmurs appreciated   Lungs: Clear to auscultation  bilaterally. No respiratory distress  Abdomen: Soft. Appropriately tender. Non-distended. Bowel sounds present.  Drains: NGT with continued bilious output  Incision: midline vertical incision with Dermabond prineo c/d/i  Extremities: No calf tenderness bilaterally   Gyn Progress Note    Subjective:   Pt seen and examined at bedside. No events overnight. Pain well controlled on PCA. Patient ambulating and voiding spontaneously, not yet passing flatus. NPO. Pt denies fever, chills, chest pain, SOB, nausea, vomiting, lightheadedness, or dizziness.      Objective:  T(F): 98.1 (07-25-24 @ 06:00), Max: 98.2 (07-24-24 @ 17:49)  HR: 97 (07-25-24 @ 06:00) (96 - 105)  BP: 134/91 (07-25-24 @ 06:00) (113/68 - 139/89)  RR: 18 (07-25-24 @ 06:00) (18 - 18)  SpO2: 99% (07-25-24 @ 06:00) (96% - 100%)  Wt(kg): --  I&O's Summary    23 Jul 2024 07:01  -  24 Jul 2024 07:00  --------------------------------------------------------  IN: 250 mL / OUT: 815 mL / NET: -565 mL    24 Jul 2024 07:01  -  25 Jul 2024 06:26  --------------------------------------------------------  IN: 400 mL / OUT: 2050 mL / NET: -1650 mL    MEDICATIONS  (STANDING):  acetaminophen   IVPB .. 1000 milliGRAM(s) IV Intermittent every 6 hours  heparin   Injectable 5000 Unit(s) SubCutaneous every 8 hours  HYDROmorphone PCA (1 mG/mL) 30 milliLiter(s) PCA Continuous PCA Continuous  ketorolac   Injectable 30 milliGRAM(s) IV Push every 6 hours  lactated ringers. 1000 milliLiter(s) (125 mL/Hr) IV Continuous <Continuous>  sodium chloride 0.9% Bolus 400 milliLiter(s) IV Bolus once    MEDICATIONS  (PRN):  HYDROmorphone PCA (1 mG/mL) Rescue Clinician Bolus 0.5 milliGRAM(s) IV Push every 15 minutes PRN For Pain Scale greater than 6  naloxone Injectable 0.1 milliGRAM(s) IV Push every 3 minutes PRN For ANY of the following changes in patient status:  A. RR LESS THAN 10 breaths per minute, B. Oxygen saturation LESS THAN 90%, C. Sedation score of 6  ondansetron Injectable 4 milliGRAM(s) IV Push every 6 hours PRN Nausea    Physical Exam:  Constitutional: NAD, Lying in bed   CV: Regular rate and rhythm. No murmurs appreciated   Lungs: Clear to auscultation  bilaterally. No respiratory distress  Abdomen: Soft. Non-tender. Non-distended. Bowel sounds present.  Drains: NGT with continued bilious output  Incision: Midline vertical incision with Dermabond prineo c/d/i  Extremities: No calf tenderness bilaterally

## 2024-07-25 NOTE — PROGRESS NOTE ADULT - ATTENDING COMMENTS
DATE OF SERVICE: 07-25-24 @ 15:54    Abd soft, less distended, less ngt output  continue ngt for now  ambulate  await bowel fx

## 2024-07-25 NOTE — PROGRESS NOTE ADULT - ASSESSMENT
· Assessment	  28F h/o HTN not on meds, presents with 2 weeks of diarrhea and diffuse abdominal cramps. Found to have 15cm adnexal mass compressing her small bowel leading to small bowel dilation and obstruction.   Now POD1 L oopherectomy, with general surgery assistance for small lysis of adhesions and serosal injury repair.   Patient reporting moderate pain. Otherwise HDS. Not passing gas or BM yet. NGT in place with 650cc of output     Plan  - keep NGT and NPO until bowel function returns  - monitor abdominal exam  - monitor bowel function  - general surgery will continue to follow  - remainder of care per OBGYN primary team appreciated    [facility], [id number]

## 2024-07-25 NOTE — PROGRESS NOTE ADULT - SUBJECTIVE AND OBJECTIVE BOX
SURGERY DAILY PROGRESS NOTE:       Subjective / Overnight events:    NGT continued to be productive - 650; denies bowel function     Objective:      MEDICATIONS  (STANDING):  acetaminophen   IVPB .. 1000 milliGRAM(s) IV Intermittent every 6 hours  heparin   Injectable 5000 Unit(s) SubCutaneous every 8 hours  HYDROmorphone PCA (1 mG/mL) 30 milliLiter(s) PCA Continuous PCA Continuous  ketorolac   Injectable 30 milliGRAM(s) IV Push every 6 hours  lactated ringers. 1000 milliLiter(s) (125 mL/Hr) IV Continuous <Continuous>  sodium chloride 0.9% Bolus 400 milliLiter(s) IV Bolus once    MEDICATIONS  (PRN):  HYDROmorphone PCA (1 mG/mL) Rescue Clinician Bolus 0.5 milliGRAM(s) IV Push every 15 minutes PRN For Pain Scale greater than 6  naloxone Injectable 0.1 milliGRAM(s) IV Push every 3 minutes PRN For ANY of the following changes in patient status:  A. RR LESS THAN 10 breaths per minute, B. Oxygen saturation LESS THAN 90%, C. Sedation score of 6  ondansetron Injectable 4 milliGRAM(s) IV Push every 6 hours PRN Nausea      Vital Signs Last 24 Hrs  T(C): 36.7 (25 Jul 2024 06:00), Max: 36.8 (24 Jul 2024 17:49)  T(F): 98.1 (25 Jul 2024 06:00), Max: 98.2 (24 Jul 2024 17:49)  HR: 97 (25 Jul 2024 06:00) (96 - 105)  BP: 134/91 (25 Jul 2024 06:00) (113/68 - 139/89)  BP(mean): --  RR: 18 (25 Jul 2024 06:00) (18 - 18)  SpO2: 99% (25 Jul 2024 06:00) (96% - 100%)    Parameters below as of 25 Jul 2024 06:00  Patient On (Oxygen Delivery Method): room air        I&O's Detail    24 Jul 2024 07:01  -  25 Jul 2024 07:00  --------------------------------------------------------  IN:    Sodium Chloride 0.9% Bolus: 400 mL  Total IN: 400 mL    OUT:    Indwelling Catheter - Urethral (mL): 650 mL    Nasogastric/Oral tube (mL): 650 mL    Voided (mL): 750 mL  Total OUT: 2050 mL    Total NET: -1650 mL          Daily     Daily     LABS:                        8.3    13.04 )-----------( 552      ( 25 Jul 2024 07:00 )             27.4                   PHYSICAL EXAM    Constitutional: Well developed, well nourished, NAD  Pulmonary: Symmetric chest rise bilaterally, no increased WOB  Gastrointestinal: Abdomen soft, nontender, nondistended. No rebound or guarding. Midline incisions C/D/I.   Groin: Soft, nontender, no ecchymosis/hematoma, no erythema, no edema. [place] in place.   Extremities: FROM, warm to touch, no clubbing/cyanosis/erythema/edema

## 2024-07-25 NOTE — PROGRESS NOTE ADULT - ATTENDING COMMENTS
GYN Attending of the Week Addendum:    I agree with the above resident's note. 28y POD#2 s/p ex-lap, LSO, R ovarian cystectomy, repair of bowel serosa (EBL 50, intra-op frozen path benign) for ovarian torsion and SBO. Patient doing well post operatively. Pain well controlled on PCA > transition to IV dilaudid pushes. Remains NPO while NG tube in place. Ambulating without difficulty. Voiding. Labs reviewed- will repeat CBC at 11am, VSS. Incision c/d/i, abdominal exam appropriate. Plan to remove NGT once return of bowel function occurs. Continue SCDs and HSQ for DVT ppx while inpatient.    Eda Patel MD

## 2024-07-26 LAB
BASOPHILS # BLD AUTO: 0.02 K/UL — SIGNIFICANT CHANGE UP (ref 0–0.2)
BASOPHILS NFR BLD AUTO: 0.2 % — SIGNIFICANT CHANGE UP (ref 0–2)
EOSINOPHIL # BLD AUTO: 0.31 K/UL — SIGNIFICANT CHANGE UP (ref 0–0.5)
EOSINOPHIL NFR BLD AUTO: 3 % — SIGNIFICANT CHANGE UP (ref 0–6)
HCT VFR BLD CALC: 25.6 % — LOW (ref 34.5–45)
HGB BLD-MCNC: 8 G/DL — LOW (ref 11.5–15.5)
IMM GRANULOCYTES NFR BLD AUTO: 1 % — HIGH (ref 0–0.9)
LYMPHOCYTES # BLD AUTO: 2.12 K/UL — SIGNIFICANT CHANGE UP (ref 1–3.3)
LYMPHOCYTES # BLD AUTO: 20.5 % — SIGNIFICANT CHANGE UP (ref 13–44)
MCHC RBC-ENTMCNC: 26.7 PG — LOW (ref 27–34)
MCHC RBC-ENTMCNC: 31.3 GM/DL — LOW (ref 32–36)
MCV RBC AUTO: 85.3 FL — SIGNIFICANT CHANGE UP (ref 80–100)
MONOCYTES # BLD AUTO: 0.55 K/UL — SIGNIFICANT CHANGE UP (ref 0–0.9)
MONOCYTES NFR BLD AUTO: 5.3 % — SIGNIFICANT CHANGE UP (ref 2–14)
NEUTROPHILS # BLD AUTO: 7.26 K/UL — SIGNIFICANT CHANGE UP (ref 1.8–7.4)
NEUTROPHILS NFR BLD AUTO: 70 % — SIGNIFICANT CHANGE UP (ref 43–77)
NRBC # BLD: 0 /100 WBCS — SIGNIFICANT CHANGE UP (ref 0–0)
PLATELET # BLD AUTO: 568 K/UL — HIGH (ref 150–400)
RBC # BLD: 3 M/UL — LOW (ref 3.8–5.2)
RBC # FLD: 15.6 % — HIGH (ref 10.3–14.5)
WBC # BLD: 10.36 K/UL — SIGNIFICANT CHANGE UP (ref 3.8–10.5)
WBC # FLD AUTO: 10.36 K/UL — SIGNIFICANT CHANGE UP (ref 3.8–10.5)

## 2024-07-26 RX ORDER — ACETAMINOPHEN 500 MG
1000 TABLET ORAL EVERY 6 HOURS
Refills: 0 | Status: DISCONTINUED | OUTPATIENT
Start: 2024-07-26 | End: 2024-07-26

## 2024-07-26 RX ORDER — ACETAMINOPHEN 500 MG
1000 TABLET ORAL EVERY 8 HOURS
Refills: 0 | Status: DISCONTINUED | OUTPATIENT
Start: 2024-07-26 | End: 2024-07-27

## 2024-07-26 RX ORDER — OXYCODONE HYDROCHLORIDE 30 MG/1
1 TABLET ORAL
Qty: 8 | Refills: 0
Start: 2024-07-26

## 2024-07-26 RX ORDER — BACTERIOSTATIC SODIUM CHLORIDE 0.9 %
300 VIAL (ML) INJECTION ONCE
Refills: 0 | Status: COMPLETED | OUTPATIENT
Start: 2024-07-26 | End: 2024-07-26

## 2024-07-26 RX ADMIN — Medication 300 MILLILITER(S): at 06:26

## 2024-07-26 RX ADMIN — HEPARIN SODIUM 5000 UNIT(S): 1000 INJECTION, SOLUTION INTRAVENOUS; SUBCUTANEOUS at 21:40

## 2024-07-26 RX ADMIN — Medication 400 MILLIGRAM(S): at 16:04

## 2024-07-26 RX ADMIN — HEPARIN SODIUM 5000 UNIT(S): 1000 INJECTION, SOLUTION INTRAVENOUS; SUBCUTANEOUS at 16:00

## 2024-07-26 RX ADMIN — Medication 400 MILLIGRAM(S): at 22:00

## 2024-07-26 RX ADMIN — Medication 0.5 MILLIGRAM(S): at 03:06

## 2024-07-26 RX ADMIN — Medication 0.5 MILLIGRAM(S): at 01:36

## 2024-07-26 RX ADMIN — Medication 30 MILLIGRAM(S): at 06:10

## 2024-07-26 RX ADMIN — Medication 1000 MILLIGRAM(S): at 17:01

## 2024-07-26 RX ADMIN — Medication 1000 MILLIGRAM(S): at 03:39

## 2024-07-26 RX ADMIN — Medication 30 MILLIGRAM(S): at 01:00

## 2024-07-26 RX ADMIN — Medication 400 MILLIGRAM(S): at 03:09

## 2024-07-26 RX ADMIN — Medication 1000 MILLIGRAM(S): at 08:37

## 2024-07-26 RX ADMIN — Medication 30 MILLIGRAM(S): at 00:31

## 2024-07-26 RX ADMIN — Medication 400 MILLIGRAM(S): at 08:07

## 2024-07-26 RX ADMIN — Medication 30 MILLIGRAM(S): at 23:51

## 2024-07-26 RX ADMIN — Medication 1000 MILLIGRAM(S): at 23:53

## 2024-07-26 RX ADMIN — Medication 30 MILLIGRAM(S): at 07:07

## 2024-07-26 RX ADMIN — HEPARIN SODIUM 5000 UNIT(S): 1000 INJECTION, SOLUTION INTRAVENOUS; SUBCUTANEOUS at 06:10

## 2024-07-26 NOTE — PROGRESS NOTE ADULT - SUBJECTIVE AND OBJECTIVE BOX
Gyn Progress Note HD#5    Subjective:   Patient seen and examined at bedside. There were no acute events overnight. Patient reports pain as well controlled. Patient is otherwise ambulating and continuing to pass flatus, voiding spontaneously, remains NPO.    Objective:  T(F): 98 (07-23-24 @ 05:30), Max: 98.6 (07-22-24 @ 10:30)  HR: 100 (07-23-24 @ 05:30) (97 - 120)  BP: 137/91 (07-23-24 @ 05:30) (133/84 - 153/91)  RR: 18 (07-23-24 @ 05:30) (15 - 18)  SpO2: 98% (07-23-24 @ 05:30) (98% - 100%)  Wt(kg): --  I&O's Summary    300cc from NGT    CAPILLARY BLOOD GLUCOSE    MEDICATIONS  (STANDING):  lactated ringers. 1000 milliLiter(s) (125 mL/Hr) IV Continuous <Continuous>    MEDICATIONS  (PRN):  acetaminophen   IVPB .. 1000 milliGRAM(s) IV Intermittent every 6 hours PRN Moderate Pain (4 - 6)  ketorolac   Injectable 30 milliGRAM(s) IV Push every 6 hours PRN Severe Pain (7 - 10)  morphine  - Injectable 4 milliGRAM(s) IV Push every 4 hours PRN Severe Pain (7 - 10)  ondansetron Injectable 4 milliGRAM(s) IV Push every 8 hours PRN Nausea and/or Vomiting      Physical Exam:  Constitutional: No acute distress. Resting in bed   CV: Regular rate and rhythm. No murmurs appreciated   Lungs: Clear to auscultation  bilaterally. No respiratory distress  Abdomen: Soft. Distended. Non-tender. Bowel sounds present throughout   Drains: NGT with continued bilious output  Extremities: No calf tenderness bilaterally    LABS:  07-23    139    |  100    |  18     ----------------------------<  96     5.0     |  25     |  0.83   07-22    139    |  99     |  17     ----------------------------<  121<H>  3.8     |  23     |  0.95     Ca    9.5        23 Jul 2024 04:32  Ca    9.6        22 Jul 2024 11:07    TPro  8.0    /  Alb  3.5    /  TBili  0.5    /  DBili  x      /  AST  25     /  ALT  37     /  AlkPhos  186<H>  07-23  TPro  8.3    /  Alb  3.7    /  TBili  0.5    /  DBili  x      /  AST  32     /  ALT  46<H>  /  AlkPhos  211<H>  07-22        PT/INR - ( 22 Jul 2024 20:16 )   PT: 14.3 sec;   INR: 1.37 ratio         PTT - ( 22 Jul 2024 20:16 )  PTT:39.6 sec  Urinalysis Basic - ( 23 Jul 2024 04:32 )    Color: x / Appearance: x / SG: x / pH: x  Gluc: 96 mg/dL / Ketone: x  / Bili: x / Urobili: x   Blood: x / Protein: x / Nitrite: x   Leuk Esterase: x / RBC: x / WBC x   Sq Epi: x / Non Sq Epi: x / Bacteria: x

## 2024-07-26 NOTE — PROGRESS NOTE ADULT - ATTENDING COMMENTS
GYN Attending of the Week Addendum:    I agree with the above resident's note. 28y POD#3 s/p ex-lap, LSO, R ovarian cystectomy, repair of bowel serosa (EBL 50, intra-op frozen path benign) for ovarian torsion and SBO. Patient doing well post operatively. Pain well controlled IV meds. Remains NPO while NG tube in place. Ambulating without difficulty. Voiding. Now passing flatus. VSS. Incision c/d/i, abdominal exam appropriate. NGT clamp trial in progress - per general surgery, will remove if output <200cc in 4h. Once removed, will transition to PO pain meds and advance diet. Continue SCDs and HSQ for DVT ppx while inpatient.     Eda Patel MD

## 2024-07-26 NOTE — PROGRESS NOTE ADULT - ATTENDING COMMENTS
GYN Attending of the Week Addendum:    I agree with the above resident's note. 28y POD#3 s/p ex-lap, LSO, R ovarian cystectomy, repair of bowel serosa (EBL 50, intra-op frozen path benign) for ovarian torsion and SBO. Patient doing well post operatively. Pain well controlled IV meds. Remains NPO while NG tube in place. Ambulating without difficulty. Voiding. Now passing flatus. VSS. Incision c/d/i, abdominal exam appropriate. NGT clamp trial in progress - per general surgery, will remove if output <200cc in 4h. Once removed, will transition to PO pain meds and advance diet. Continue SCDs and HSQ for DVT ppx while inpatient.     Eda Patel MD DATE OF SERVICE: 07-26-24 @ 14:26    Feeling well today, having bowel fx, no complaints  NGT removed  abd soft nt/nd, incision c/d/i    CLD today  advance as tolerated

## 2024-07-26 NOTE — PROGRESS NOTE ADULT - ASSESSMENT
Assessment/Plan: 29yo POD#3, s/p ex-lap LSO, R ovarian cystectomy, and repair of bowel serosa w/ general surgery (EBL 50, intra-op frozen path benign) for concerns of ovarian torsion in the setting of large pelvic mass and SBO on CT. Patient is progressing post-operatively, has begun passing flatus.    Neuro: PCA d/c, transitioned to IV analgesia  CV: Hemodynamically stable. H/H 10.5/32.6 (7/24), 8.3/27.4->8.7/27.4 (6A and 11A 7/25).   - f/u AM CBC  Pulm: Saturating well on room air. For incentive spirometer use   GI: NPO w/ NGT in place; replete NGT output PRN.  - Plan for NGT until return of bowel function  - Appreciate general surgery input   : Voiding spontaneously  FEN: LR@125. Replete electrolytes PRN  Heme: c/w HSQ and SCDs for DVT ppx.  Dispo: Continue routine post-op care.

## 2024-07-26 NOTE — PROGRESS NOTE ADULT - ASSESSMENT
28F h/o HTN not on meds, presents with 2 weeks of diarrhea and diffuse abdominal cramps. Found to have 15cm adnexal mass compressing her small bowel leading to small bowel dilation and obstruction.   Now s/p L oopherectomy, with general surgery assistance for small lysis of adhesions and serosal injury repair.   Patient reporting moderate pain. Otherwise HDS. Now passing gas overnight. NGT in place with 1L of output in 24 hours     Plan  - Clamp NGT for 4 hours. If residual is <200cc it can be removed   - monitor abdominal exam  - monitor bowel function  - general surgery will continue to follow  - remainder of care per OBGYN primary team appreciated    [facility], [id number]

## 2024-07-26 NOTE — PROGRESS NOTE ADULT - SUBJECTIVE AND OBJECTIVE BOX
SURGERY DAILY PROGRESS NOTE:       Subjective / Overnight events:    Passing gas, feeling less distended       Objective:      MEDICATIONS  (STANDING):  heparin   Injectable 5000 Unit(s) SubCutaneous every 8 hours  ketorolac   Injectable 30 milliGRAM(s) IV Push every 6 hours  lactated ringers. 1000 milliLiter(s) (125 mL/Hr) IV Continuous <Continuous>    MEDICATIONS  (PRN):  HYDROmorphone  Injectable 0.5 milliGRAM(s) IV Push every 4 hours PRN Pain  naloxone Injectable 0.1 milliGRAM(s) IV Push every 3 minutes PRN For ANY of the following changes in patient status:  A. RR LESS THAN 10 breaths per minute, B. Oxygen saturation LESS THAN 90%, C. Sedation score of 6  ondansetron Injectable 4 milliGRAM(s) IV Push every 6 hours PRN Nausea      Vital Signs Last 24 Hrs  T(C): 36.7 (26 Jul 2024 05:25), Max: 37.2 (25 Jul 2024 17:39)  T(F): 98 (26 Jul 2024 05:25), Max: 98.9 (25 Jul 2024 17:39)  HR: 87 (26 Jul 2024 05:25) (70 - 106)  BP: 144/86 (26 Jul 2024 06:55) (131/90 - 148/99)  BP(mean): --  RR: 18 (26 Jul 2024 05:25) (18 - 18)  SpO2: 97% (26 Jul 2024 05:25) (96% - 98%)    Parameters below as of 26 Jul 2024 05:25  Patient On (Oxygen Delivery Method): room air        I&O's Detail    25 Jul 2024 07:01  -  26 Jul 2024 07:00  --------------------------------------------------------  IN:    Sodium Chloride 0.9% Bolus: 700 mL  Total IN: 700 mL    OUT:    Nasogastric/Oral tube (mL): 1100 mL  Total OUT: 1100 mL    Total NET: -400 mL          Daily     Daily     LABS:                        8.0    10.36 )-----------( 568      ( 26 Jul 2024 06:53 )             25.6                   PHYSICAL EXAM  Constitutional: Well developed, well nourished, NAD. NGT in place   Pulmonary: Symmetric chest rise bilaterally, no increased WOB  Gastrointestinal: Abdomen soft, nontender, nondistended. No rebound or guarding. Midline incisions C/D/I.   Groin: Soft, nontender, no ecchymosis/hematoma, no erythema, no edema. [place] in place.   Extremities: FROM, warm to touch, no clubbing/cyanosis/erythema/edema

## 2024-07-27 LAB
HCT VFR BLD CALC: 27.9 % — LOW (ref 34.5–45)
HGB BLD-MCNC: 8.6 G/DL — LOW (ref 11.5–15.5)
MCHC RBC-ENTMCNC: 26 PG — LOW (ref 27–34)
MCHC RBC-ENTMCNC: 30.8 GM/DL — LOW (ref 32–36)
MCV RBC AUTO: 84.3 FL — SIGNIFICANT CHANGE UP (ref 80–100)
NRBC # BLD: 0 /100 WBCS — SIGNIFICANT CHANGE UP (ref 0–0)
PLATELET # BLD AUTO: 587 K/UL — HIGH (ref 150–400)
RBC # BLD: 3.31 M/UL — LOW (ref 3.8–5.2)
RBC # FLD: 15.1 % — HIGH (ref 10.3–14.5)
WBC # BLD: 9.43 K/UL — SIGNIFICANT CHANGE UP (ref 3.8–10.5)
WBC # FLD AUTO: 9.43 K/UL — SIGNIFICANT CHANGE UP (ref 3.8–10.5)

## 2024-07-27 RX ORDER — OXYCODONE HYDROCHLORIDE 30 MG/1
5 TABLET ORAL EVERY 6 HOURS
Refills: 0 | Status: DISCONTINUED | OUTPATIENT
Start: 2024-07-27 | End: 2024-07-28

## 2024-07-27 RX ORDER — BACTERIOSTATIC SODIUM CHLORIDE 0.9 %
3 VIAL (ML) INJECTION EVERY 8 HOURS
Refills: 0 | Status: DISCONTINUED | OUTPATIENT
Start: 2024-07-27 | End: 2024-07-28

## 2024-07-27 RX ORDER — IBUPROFEN 200 MG
600 TABLET ORAL EVERY 6 HOURS
Refills: 0 | Status: DISCONTINUED | OUTPATIENT
Start: 2024-07-27 | End: 2024-07-28

## 2024-07-27 RX ORDER — ACETAMINOPHEN 500 MG
975 TABLET ORAL EVERY 6 HOURS
Refills: 0 | Status: DISCONTINUED | OUTPATIENT
Start: 2024-07-27 | End: 2024-07-28

## 2024-07-27 RX ORDER — OXYCODONE HYDROCHLORIDE 30 MG/1
10 TABLET ORAL EVERY 6 HOURS
Refills: 0 | Status: DISCONTINUED | OUTPATIENT
Start: 2024-07-27 | End: 2024-07-28

## 2024-07-27 RX ORDER — METOPROLOL TARTRATE 100 MG
5 TABLET ORAL EVERY 6 HOURS
Refills: 0 | Status: DISCONTINUED | OUTPATIENT
Start: 2024-07-27 | End: 2024-07-28

## 2024-07-27 RX ADMIN — Medication 600 MILLIGRAM(S): at 10:53

## 2024-07-27 RX ADMIN — Medication 30 MILLIGRAM(S): at 07:00

## 2024-07-27 RX ADMIN — HEPARIN SODIUM 5000 UNIT(S): 1000 INJECTION, SOLUTION INTRAVENOUS; SUBCUTANEOUS at 05:42

## 2024-07-27 RX ADMIN — Medication 975 MILLIGRAM(S): at 18:42

## 2024-07-27 RX ADMIN — Medication 600 MILLIGRAM(S): at 00:00

## 2024-07-27 RX ADMIN — Medication 400 MILLIGRAM(S): at 04:00

## 2024-07-27 RX ADMIN — HEPARIN SODIUM 5000 UNIT(S): 1000 INJECTION, SOLUTION INTRAVENOUS; SUBCUTANEOUS at 22:04

## 2024-07-27 RX ADMIN — Medication 30 MILLIGRAM(S): at 01:00

## 2024-07-27 RX ADMIN — Medication 975 MILLIGRAM(S): at 22:17

## 2024-07-27 RX ADMIN — Medication 600 MILLIGRAM(S): at 18:49

## 2024-07-27 RX ADMIN — HEPARIN SODIUM 5000 UNIT(S): 1000 INJECTION, SOLUTION INTRAVENOUS; SUBCUTANEOUS at 14:36

## 2024-07-27 RX ADMIN — Medication 5 MILLIGRAM(S): at 17:29

## 2024-07-27 RX ADMIN — Medication 30 MILLIGRAM(S): at 05:44

## 2024-07-27 RX ADMIN — Medication 975 MILLIGRAM(S): at 12:36

## 2024-07-27 RX ADMIN — Medication 975 MILLIGRAM(S): at 23:00

## 2024-07-27 RX ADMIN — Medication 1000 MILLIGRAM(S): at 05:00

## 2024-07-27 RX ADMIN — Medication 600 MILLIGRAM(S): at 17:48

## 2024-07-27 RX ADMIN — Medication 975 MILLIGRAM(S): at 13:45

## 2024-07-27 NOTE — PROGRESS NOTE ADULT - SUBJECTIVE AND OBJECTIVE BOX
R2 Gyn Progress Note POD#4 HD#5    Subjective:   Patient seen and examined at bedside. There were no acute events overnight. Patient reports pain as well controlled. Patient is otherwise ambulating and continuing to pass flatus, voiding spontaneously. Tolerating clears.    Vital Signs Last 24 Hours  T(C): 36.6 (07-27-24 @ 05:57), Max: 36.9 (07-27-24 @ 01:21)  HR: 79 (07-27-24 @ 05:57) (79 - 98)  BP: 141/96 (07-27-24 @ 05:57) (136/89 - 152/97)  RR: 18 (07-27-24 @ 05:57) (17 - 18)  SpO2: 99% (07-27-24 @ 05:57) (98% - 100%)    I&O's Summary      Physical Exam:  General: NAD  CV: RR, S1, S2  Lungs: CTA b/l  Abdomen: Soft, appropriately-tender to palpation diffusely, softly distended  Incision: Midline vertical CDI  Ext: No pain or swelling, SCD's in place    Labs:                        8.0    10.36 )-----------( 568      ( 26 Jul 2024 06:53 )             25.6   baso 0.2    eos 3.0    imm gran 1.0    lymph 20.5   mono 5.3    poly 70.0                         8.7    13.19 )-----------( 564      ( 25 Jul 2024 11:00 )             27.4   baso x      eos x      imm gran x      lymph x      mono x      poly x                            8.3    13.04 )-----------( 552      ( 25 Jul 2024 07:00 )             27.4   baso x      eos x      imm gran x      lymph x      mono x      poly x          MEDICATIONS  (STANDING):  acetaminophen   IVPB .. 1000 milliGRAM(s) IV Intermittent every 8 hours  heparin   Injectable 5000 Unit(s) SubCutaneous every 8 hours  ketorolac   Injectable 30 milliGRAM(s) IV Push every 6 hours  lactated ringers. 1000 milliLiter(s) (125 mL/Hr) IV Continuous <Continuous>    MEDICATIONS  (PRN):  HYDROmorphone  Injectable 0.5 milliGRAM(s) IV Push every 4 hours PRN Pain  naloxone Injectable 0.1 milliGRAM(s) IV Push every 3 minutes PRN For ANY of the following changes in patient status:  A. RR LESS THAN 10 breaths per minute, B. Oxygen saturation LESS THAN 90%, C. Sedation score of 6  ondansetron Injectable 4 milliGRAM(s) IV Push every 6 hours PRN Nausea

## 2024-07-27 NOTE — PROGRESS NOTE ADULT - SUBJECTIVE AND OBJECTIVE BOX
24h Events:  No acute events overnight.    Subjective:   Patient seen at bedside this AM. Tolerating CLD. Passing flatus/BM. Denies nausea, vomiting, fevers, chills, CP, SOB.     Objective:  Vital Signs  T(C): 36.6 (07-27 @ 05:57), Max: 36.9 (07-27 @ 01:21)  HR: 79 (07-27 @ 05:57) (79 - 92)  BP: 148/97 (07-27 @ 08:13) (136/89 - 152/97)  RR: 17 (07-27 @ 08:13) (17 - 18)  SpO2: 100% (07-27 @ 08:13) (98% - 100%)    Physical Exam:  GEN: NAD  RESP: RA  ABD: S, nd, nttp, incision well healing  EXTR: WWP  NEURO: A/Ox4    Labs:                        8.6    9.43  )-----------( 587      ( 27 Jul 2024 07:02 )             27.9         CAPILLARY BLOOD GLUCOSE          Imaging:

## 2024-07-27 NOTE — PROGRESS NOTE ADULT - ATTENDING COMMENTS
Patient seen and evaluated this morning. Agree with above. Patient is POD4 s/p emergent ex-lap left detorsion, salpingo-oophorectomy with removal of large pelvic mass causing SBO, right ovarian cystectomy, and serosal bowel repair by General surgery. Frozen of large pelvic mass benign hemorraghic cyst. Her post op course require NGT s/p removal yesterday morning. The patient tolerated a clear diet POD3 and transitioned to Regular diet and PO meds today. She is passing gas, had a BM this morning. Pain very well controlled. Ambulating in room and in hallway without difficulty. Abdomen soft, NT, ND with midline vertical incision with Perineo in place; c/d/i with no surrounding erythema or induration. Agree with plan above. Dispo: inpatient management with possible discharge tomorrow pending clinical status.  MD Sukumar                            8.6    9.43  )-----------( 587      ( 27 Jul 2024 07:02 )             27.9     ICU Vital Signs Last 24 Hrs  T(C): 36.6 (27 Jul 2024 05:57), Max: 36.9 (27 Jul 2024 01:21)  T(F): 97.9 (27 Jul 2024 05:57), Max: 98.5 (27 Jul 2024 01:21)  HR: 79 (27 Jul 2024 05:57) (79 - 92)  BP: 148/97 (27 Jul 2024 08:13) (136/89 - 152/97)  RR: 17 (27 Jul 2024 08:13) (17 - 18)  SpO2: 100% (27 Jul 2024 08:13) (98% - 100%)    O2 Parameters below as of 27 Jul 2024 08:13  Patient On (Oxygen Delivery Method): room air

## 2024-07-27 NOTE — PROGRESS NOTE ADULT - ASSESSMENT
28F h/o HTN not on meds, presents with 2 weeks of diarrhea and diffuse abdominal cramps. Found to have 15cm adnexal mass compressing her small bowel leading to small bowel dilation and obstruction.   Now s/p L oopherectomy, with general surgery assistance for small lysis of adhesions and serosal injury repair.   Patient reporting moderate pain. Otherwise HDS. Now passing gas overnight. NGT in place with 1L of output in 24 hours   NGT now dc'd. Tolerating CLD, bowel fx present.     Plan  - ADAT   - general surgery will s/o, reconsult PRN  - remainder of care per OBGYN primary team appreciated    [facility], [id number]

## 2024-07-27 NOTE — PROGRESS NOTE ADULT - ASSESSMENT
A/P: 27yo POD#4, s/p ex-lap LSO, R ovarian cystectomy, and repair of bowel serosa w/ general surgery (EBL 50, intra-op frozen path benign) for concerns of ovarian torsion in the setting of large pelvic mass and SBO on CT. Patient is progressing post-operatively, is passing flatus, had BM overnight, and is tolerating clears.    Neuro: s/p PCA. IV medications discontinued. Start PO Tylenol and Motrin today.  CV: Hemodynamically stable. H/H 10.5/32.6 (7/24), 8.3/27.4->8.7/27.4 (6A and 11A 7/25)->8.0/25.6->8.6/27.9.  Pulm: Saturating well on room air. For incentive spirometer use   GI: s/p NGT (7/23-7/26).    - Tolerating clears  - Passing flatus and having formed BMs.   - Plan to advance to regular diet today.  : Voiding spontaneously  FEN: LR@125. Replete electrolytes PRN  - Plan to saline lock once tolerating regular diet  Heme: c/w HSQ and SCDs for DVT ppx.  Dispo: Possible discharge planning today pending tolerating diet.    MONA Steve, PGY-2 A/P: 29yo POD#4, s/p ex-lap LSO, R ovarian cystectomy, and repair of bowel serosa w/ general surgery (EBL 50, intra-op frozen path benign) for concerns of ovarian torsion in the setting of large pelvic mass and SBO on CT. Patient is progressing post-operatively, is passing flatus, had BM overnight, and is tolerating clears.    Neuro: s/p PCA. IV medications discontinued. Start PO Tylenol and Motrin today.  CV: Hemodynamically stable. H/H 10.5/32.6 (7/24), 8.3/27.4->8.7/27.4 (6A and 11A 7/25)->8.0/25.6->8.6/27.9.  Pulm: Saturating well on room air. For incentive spirometer use   GI: s/p NGT (7/23-7/26).    - Tolerating clears  - Passing flatus and having formed BMs.   - Plan to advance to regular diet today.  : Voiding spontaneously  FEN: LR@125. Replete electrolytes PRN  - Plan to saline lock once tolerating regular diet  Heme: c/w HSQ and SCDs for DVT ppx.  Dispo: Possible discharge planning tomorrow pending tolerating diet.    MONA Steve, PGY-2

## 2024-07-28 VITALS
RESPIRATION RATE: 18 BRPM | SYSTOLIC BLOOD PRESSURE: 128 MMHG | TEMPERATURE: 98 F | OXYGEN SATURATION: 95 % | DIASTOLIC BLOOD PRESSURE: 97 MMHG | HEART RATE: 93 BPM

## 2024-07-28 DIAGNOSIS — R03.0 ELEVATED BLOOD-PRESSURE READING, WITHOUT DIAGNOSIS OF HYPERTENSION: ICD-10-CM

## 2024-07-28 LAB
BASOPHILS # BLD AUTO: 0.03 K/UL — SIGNIFICANT CHANGE UP (ref 0–0.2)
BASOPHILS NFR BLD AUTO: 0.4 % — SIGNIFICANT CHANGE UP (ref 0–2)
EOSINOPHIL # BLD AUTO: 0.29 K/UL — SIGNIFICANT CHANGE UP (ref 0–0.5)
EOSINOPHIL NFR BLD AUTO: 3.5 % — SIGNIFICANT CHANGE UP (ref 0–6)
HCT VFR BLD CALC: 28.6 % — LOW (ref 34.5–45)
HGB BLD-MCNC: 9.1 G/DL — LOW (ref 11.5–15.5)
IMM GRANULOCYTES NFR BLD AUTO: 1.7 % — HIGH (ref 0–0.9)
LYMPHOCYTES # BLD AUTO: 2.66 K/UL — SIGNIFICANT CHANGE UP (ref 1–3.3)
LYMPHOCYTES # BLD AUTO: 32.2 % — SIGNIFICANT CHANGE UP (ref 13–44)
MCHC RBC-ENTMCNC: 26.5 PG — LOW (ref 27–34)
MCHC RBC-ENTMCNC: 31.8 GM/DL — LOW (ref 32–36)
MCV RBC AUTO: 83.1 FL — SIGNIFICANT CHANGE UP (ref 80–100)
MONOCYTES # BLD AUTO: 0.52 K/UL — SIGNIFICANT CHANGE UP (ref 0–0.9)
MONOCYTES NFR BLD AUTO: 6.3 % — SIGNIFICANT CHANGE UP (ref 2–14)
NEUTROPHILS # BLD AUTO: 4.61 K/UL — SIGNIFICANT CHANGE UP (ref 1.8–7.4)
NEUTROPHILS NFR BLD AUTO: 55.9 % — SIGNIFICANT CHANGE UP (ref 43–77)
NRBC # BLD: 0 /100 WBCS — SIGNIFICANT CHANGE UP (ref 0–0)
PLATELET # BLD AUTO: 602 K/UL — HIGH (ref 150–400)
RBC # BLD: 3.44 M/UL — LOW (ref 3.8–5.2)
RBC # FLD: 14.8 % — HIGH (ref 10.3–14.5)
WBC # BLD: 8.25 K/UL — SIGNIFICANT CHANGE UP (ref 3.8–10.5)
WBC # FLD AUTO: 8.25 K/UL — SIGNIFICANT CHANGE UP (ref 3.8–10.5)

## 2024-07-28 PROCEDURE — 93975 VASCULAR STUDY: CPT

## 2024-07-28 PROCEDURE — 88305 TISSUE EXAM BY PATHOLOGIST: CPT

## 2024-07-28 PROCEDURE — 81025 URINE PREGNANCY TEST: CPT

## 2024-07-28 PROCEDURE — 85025 COMPLETE CBC W/AUTO DIFF WBC: CPT

## 2024-07-28 PROCEDURE — 82550 ASSAY OF CK (CPK): CPT

## 2024-07-28 PROCEDURE — 84702 CHORIONIC GONADOTROPIN TEST: CPT

## 2024-07-28 PROCEDURE — 85014 HEMATOCRIT: CPT

## 2024-07-28 PROCEDURE — 88112 CYTOPATH CELL ENHANCE TECH: CPT

## 2024-07-28 PROCEDURE — 86850 RBC ANTIBODY SCREEN: CPT

## 2024-07-28 PROCEDURE — 76830 TRANSVAGINAL US NON-OB: CPT

## 2024-07-28 PROCEDURE — 83690 ASSAY OF LIPASE: CPT

## 2024-07-28 PROCEDURE — 82947 ASSAY GLUCOSE BLOOD QUANT: CPT

## 2024-07-28 PROCEDURE — 82378 CARCINOEMBRYONIC ANTIGEN: CPT

## 2024-07-28 PROCEDURE — 74177 CT ABD & PELVIS W/CONTRAST: CPT | Mod: MC

## 2024-07-28 PROCEDURE — 86901 BLOOD TYPING SEROLOGIC RH(D): CPT

## 2024-07-28 PROCEDURE — 84295 ASSAY OF SERUM SODIUM: CPT

## 2024-07-28 PROCEDURE — 86301 IMMUNOASSAY TUMOR CA 19-9: CPT

## 2024-07-28 PROCEDURE — 84132 ASSAY OF SERUM POTASSIUM: CPT

## 2024-07-28 PROCEDURE — 85027 COMPLETE CBC AUTOMATED: CPT

## 2024-07-28 PROCEDURE — 81001 URINALYSIS AUTO W/SCOPE: CPT

## 2024-07-28 PROCEDURE — 88307 TISSUE EXAM BY PATHOLOGIST: CPT

## 2024-07-28 PROCEDURE — 87086 URINE CULTURE/COLONY COUNT: CPT

## 2024-07-28 PROCEDURE — 71045 X-RAY EXAM CHEST 1 VIEW: CPT

## 2024-07-28 PROCEDURE — 88331 PATH CONSLTJ SURG 1 BLK 1SPC: CPT

## 2024-07-28 PROCEDURE — 76856 US EXAM PELVIC COMPLETE: CPT

## 2024-07-28 PROCEDURE — 85018 HEMOGLOBIN: CPT

## 2024-07-28 PROCEDURE — C9399: CPT

## 2024-07-28 PROCEDURE — 96374 THER/PROPH/DIAG INJ IV PUSH: CPT

## 2024-07-28 PROCEDURE — 99285 EMERGENCY DEPT VISIT HI MDM: CPT

## 2024-07-28 PROCEDURE — 86803 HEPATITIS C AB TEST: CPT

## 2024-07-28 PROCEDURE — 85730 THROMBOPLASTIN TIME PARTIAL: CPT

## 2024-07-28 PROCEDURE — 82803 BLOOD GASES ANY COMBINATION: CPT

## 2024-07-28 PROCEDURE — 83605 ASSAY OF LACTIC ACID: CPT

## 2024-07-28 PROCEDURE — 86703 HIV-1/HIV-2 1 RESULT ANTBDY: CPT

## 2024-07-28 PROCEDURE — 86900 BLOOD TYPING SEROLOGIC ABO: CPT

## 2024-07-28 PROCEDURE — 99222 1ST HOSP IP/OBS MODERATE 55: CPT

## 2024-07-28 PROCEDURE — 85610 PROTHROMBIN TIME: CPT

## 2024-07-28 PROCEDURE — 82330 ASSAY OF CALCIUM: CPT

## 2024-07-28 PROCEDURE — 36415 COLL VENOUS BLD VENIPUNCTURE: CPT

## 2024-07-28 PROCEDURE — 80053 COMPREHEN METABOLIC PANEL: CPT

## 2024-07-28 PROCEDURE — 82435 ASSAY OF BLOOD CHLORIDE: CPT

## 2024-07-28 PROCEDURE — 87637 SARSCOV2&INF A&B&RSV AMP PRB: CPT

## 2024-07-28 PROCEDURE — 86304 IMMUNOASSAY TUMOR CA 125: CPT

## 2024-07-28 RX ADMIN — Medication 975 MILLIGRAM(S): at 08:29

## 2024-07-28 RX ADMIN — Medication 975 MILLIGRAM(S): at 09:30

## 2024-07-28 RX ADMIN — HEPARIN SODIUM 5000 UNIT(S): 1000 INJECTION, SOLUTION INTRAVENOUS; SUBCUTANEOUS at 06:22

## 2024-07-28 NOTE — CONSULT NOTE ADULT - SUBJECTIVE AND OBJECTIVE BOX
HPI:  MADELINE INFANTE  28y  Female [id number]    HPI: 29yo , currently on her menses, who presented to the ED with x2 weeks of intermittent diffuse abdominal pain along with x3 days of intermittent n/v. Pain is described as "gas-like" that is exacerbated with position changes. She notes periods which she feels better. Currently, states she feels better, but had received Morphine at 1537. Patient reports x3 episodes of vomiting over the past x3 days, but has not vomited for the past 12 hours. Heat pads improve the pain. Denies any episode of vomiting today. Patient having bowel movements and passing flatus.    Denies any chest pain, shortness of breath, fevers, chills, n/v, light-headedness, dizziness, abnormal vaginal discharge, significant vaginal bleeding, dysuria, issues with bowel movements, or any other complaints. Last had PO at 3p     Name of Ob/Gyn Physician: Does not have one and has not seen one in ~10yrs    POB:   -  x1 (12y ago)  PGyn: Denies  MedHx: HTN  SurgHx: Denies   Meds: Vitamins  Allergies: NKDA  Social: Denies any tobacco use or drug use. Social alcohol use    (2024 22:16)    28F w/ no significant PMHx, takes no medications at  home presented with abdominal pain, found to have large pelvic mass and SBP on CT scan, now s/p ex-lap LSO with R ovarian cystectomy and repair of bowel serosa. On , pt was noted to have BP to 160s, improved with lopressor 5mg to 140s. Medicine consulted for BP management. Patient notes that she has had purposeful weight loss in last year, goes to the gym and has lost 60+lbs, never had BP issues and follows with PCP. She notes that on  she was experiencing pain, which has since resolved.       PAST MEDICAL & SURGICAL HISTORY:  HTN (hypertension)          Review of Systems:   CONSTITUTIONAL: No fever, chills  HEENT: No sore throat, vision changes  RESPIRATORY: No cough, wheezing, shortness of breath  CARDIOVASCULAR: No chest pain, palpitations, leg edema  GASTROINTESTINAL: + abdominal pain, no nausea, vomiting, diarrhea or constipation. No melena or hematochezia.  GENITOURINARY: No dysuria, frequency, hematuria  NEUROLOGICAL: No headaches, memory loss, loss of strength, numbness, or tremors  SKIN: No itching, burning, rashes, or lesions   MUSCULOSKELETAL: No joint pain or swelling; No muscle, back, or extremity pain  PSYCHIATRIC: No depression, anxiety  HEME/LYMPH: No easy bruising, or bleeding gums      Allergies    No Known Allergies    Intolerances        Social History: Rare EtOH, no smoking or illicit drugs    FAMILY HISTORY:    Home meds:  NONE    MEDICATIONS  (STANDING):  acetaminophen     Tablet .. 975 milliGRAM(s) Oral every 6 hours  heparin   Injectable 5000 Unit(s) SubCutaneous every 8 hours  ibuprofen  Tablet. 600 milliGRAM(s) Oral every 6 hours  sodium chloride 0.9% lock flush 3 milliLiter(s) IV Push every 8 hours    MEDICATIONS  (PRN):  metoprolol tartrate Injectable 5 milliGRAM(s) IV Push every 6 hours PRN If SBP>160 and/or DBP >110  naloxone Injectable 0.1 milliGRAM(s) IV Push every 3 minutes PRN For ANY of the following changes in patient status:  A. RR LESS THAN 10 breaths per minute, B. Oxygen saturation LESS THAN 90%, C. Sedation score of 6  ondansetron Injectable 4 milliGRAM(s) IV Push every 6 hours PRN Nausea  oxyCODONE    IR 5 milliGRAM(s) Oral every 6 hours PRN Moderate Pain (4 - 6)  oxyCODONE    IR 10 milliGRAM(s) Oral every 6 hours PRN Severe Pain (7 - 10)        CAPILLARY BLOOD GLUCOSE        I&O's Summary      Physical Exam:  Vital Signs Last 24 Hrs  T(C): 36.6 (2024 14:00), Max: 37 (2024 08:30)  T(F): 97.8 (2024 14:00), Max: 98.6 (2024 08:30)  HR: 93 (2024 14:00) (75 - 93)  BP: 128/97 (2024 14:00) (128/97 - 161/111)  BP(mean): --  RR: 18 (2024 14:00) (17 - 18)  SpO2: 95% (2024 14:00) (95% - 100%)    Parameters below as of 2024 14:00  Patient On (Oxygen Delivery Method): room air        CONSTITUTIONAL: NAD, well-developed, well-groomed  EYES: PERRLA; conjunctiva and sclera clear  ENMT: Moist oral mucosa, no pharyngeal injection or exudates; normal dentition  NECK: Supple, no palpable masses; no thyromegaly  RESPIRATORY: Normal respiratory effort; lungs are clear to auscultation bilaterally  CARDIOVASCULAR: Regular rate and rhythm, normal S1 and S2, no murmur/rub/gallop; No lower extremity edema; Peripheral pulses are 2+ bilaterally  ABDOMEN: Soft, Nondistended, appropriately tender to palpation, midline vertical incision site c/d/i  MUSCULOSKELETAL:  No clubbing or cyanosis of digits; no joint swelling or tenderness to palpation  PSYCH: A+O to person, place, and time; affect appropriate  NEUROLOGY: CN 2-12 are grossly intact and symmetric; no gross sensory deficits   SKIN: No rashes; no palpable lesions    LABS:                        9.1    8.25  )-----------( 602      ( 2024 06:48 )             28.6                       RADIOLOGY & ADDITIONAL TESTS:  Results Reviewed:   Imaging Personally Reviewed:  Electrocardiogram Personally Reviewed:    COORDINATION OF CARE:  Care Discussed with Consultants/Other Providers [Y/N]:  Prior or Outpatient Records Reviewed [Y/N]:

## 2024-07-28 NOTE — DISCHARGE NOTE NURSING/CASE MANAGEMENT/SOCIAL WORK - PATIENT PORTAL LINK FT
You can access the FollowMyHealth Patient Portal offered by [facility] by registering at the following website: [email or web address]. By joining [employer]’s FollowMyHealth portal, you will also be able to view your health information using other applications (apps) compatible with our system.

## 2024-07-28 NOTE — PROGRESS NOTE ADULT - ATTENDING COMMENTS
Patient discussed with resident. Patient reports feeling well. Denies chest pain, shortness of breath, nausea/vomiting. + flatus + BM   Tolerating diet, ambulating and voiding spontaneously.   Vitals reviewed, hypertensive with normal pulse   Gen: no acute distress   Abd: soft, minimally tender   Incision: clean/dry/intact   Ext: no calf tenderness   Labs and meds reviewed   A/P: POD # 5, doing well   -plan for d/c home   -pain control PRN   -encourage ambulation and incentive spirometry use     Hypertension   -medicine consult     SAIGE Portillo

## 2024-07-28 NOTE — PROGRESS NOTE ADULT - ASSESSMENT
A/P: 27yo POD#5, s/p ex-lap LSO, R ovarian cystectomy, and repair of bowel serosa w/ general surgery (EBL 50, intra-op frozen path benign) for concerns of ovarian torsion in the setting of large pelvic mass and SBO on CT. Patient is progressing post-operatively, is passing flatus, had BM overnight, and is tolerating clears.    Neuro: s/p PCA. IV medications discontinued. Start PO Tylenol and Motrin today.  CV: Hemodynamically stable. H/H 10.5/32.6 (7/24), 8.3/27.4->8.7/27.4 (6A and 11A 7/25)->8.0/25.6->8.6/27.9.  Pulm: Saturating well on room air. For incentive spirometer use   GI: s/p NGT (7/23-7/26).    - Tolerating clears  - Passing flatus and having formed BMs.   - Plan to advance to regular diet today.  : Voiding spontaneously  FEN: LR@125. Replete electrolytes PRN  - Plan to saline lock once tolerating regular diet  Heme: c/w HSQ and SCDs for DVT ppx.  Dispo: Possible discharge planning tomorrow pending tolerating diet.    MONA Steve, PGY-2 A/P: 27yo POD#5, s/p ex-lap LSO, R ovarian cystectomy, and repair of bowel serosa w/ general surgery (EBL 50, intra-op frozen path benign) for concerns of ovarian torsion in the setting of large pelvic mass and SBO on CT. Patient is progressing post-operatively, is passing flatus, had BM overnight, and is tolerating clears.    Neuro: s/p PCA. IV medications discontinued. Start PO Tylenol and Motrin today.  CV: Hemodynamically stable. H/H 10.5/32.6 (7/24), 8.3/27.4->8.7/27.4 (6A and 11A 7/25)->8.0/25.6->8.6/27.9.  Pulm: Saturating well on room air. For incentive spirometer use   GI: s/p NGT (7/23-7/26).    - Tolerating regular diet  - Passing flatus and having formed BMs.   - Plan to advance to regular diet today.  : Voiding spontaneously  FEN: SLIV. Replete electrolytes PRN  Heme: c/w HSQ and SCDs for DVT ppx.  Dispo: Discharge planning    MONA Steve, PGY-2 A/P: 27yo POD#5, s/p ex-lap LSO, R ovarian cystectomy, and repair of bowel serosa w/ general surgery (EBL 50, intra-op frozen path benign) for concerns of ovarian torsion in the setting of large pelvic mass and SBO on CT. Patient is progressing post-operatively, is passing flatus, had BM overnight, and is tolerating clears.    Neuro: s/p PCA. IV medications discontinued. Start PO Tylenol and Motrin today.  CV: Hemodynamically stable. H/H 10.5/32.6 (7/24), 8.3/27.4->8.7/27.4 (6A and 11A 7/25)->8.0/25.6->8.6/27.9.  - Patient with persistent hypertension to 140/90s with BP max of 161/111 on 7/27 requiring Lopressor PRN  - Patient to follow up with PCP for HTN management  Pulm: Saturating well on room air. For incentive spirometer use   GI: s/p NGT (7/23-7/26).    - Tolerating regular diet  - Passing flatus and having formed BMs.   : Voiding spontaneously  FEN: SLIV. Replete electrolytes PRN  Heme: c/w HSQ and SCDs for DVT ppx.  Dispo: Discharge planning    MONA Steve, PGY-2

## 2024-07-28 NOTE — PROGRESS NOTE ADULT - REASON FOR ADMISSION
Ovarian mass, possible SBO

## 2024-07-28 NOTE — DISCHARGE NOTE NURSING/CASE MANAGEMENT/SOCIAL WORK - NSDCPNINST_GEN_ALL_CORE
See doctor in two weeks for follow up visit. Call sooner if having: vaginal bleeding, fever, shortness of breath, nausea/vomiting, chest pain. Monitor incision for signs of infection: fever, drainage, redness or separation.  Take medications as prescribed.

## 2024-07-28 NOTE — PROGRESS NOTE ADULT - SUBJECTIVE AND OBJECTIVE BOX
R2 GYN Progress Note    POD#5   HD#6    Patient seen and examined at bedside. There were no acute events overnight. Patient reports pain as well controlled. Patient is otherwise ambulating and continuing to pass flatus, voiding spontaneously. Tolerating regular diet.    Denies CP, SOB, N/V, fevers, and chills.    Vital Signs Last 24 Hours  T(C): 36.8 (07-28-24 @ 05:21), Max: 36.9 (07-27-24 @ 17:23)  HR: 75 (07-28-24 @ 05:21) (75 - 86)  BP: 148/97 (07-28-24 @ 05:21) (129/93 - 161/111)  RR: 18 (07-28-24 @ 05:21) (17 - 18)  SpO2: 99% (07-28-24 @ 05:21) (99% - 100%)    I&O's Summary      PPhysical Exam:  General: NAD  CV: RR, S1, S2  Lungs: CTA b/l  Abdomen: Soft, appropriately-tender to palpation diffusely, softly distended  Incision: Midline vertical CDI  Ext: No pain or swelling      Labs:                        8.6    9.43  )-----------( 587      ( 27 Jul 2024 07:02 )             27.9   baso x      eos x      imm gran x      lymph x      mono x      poly x                            8.0    10.36 )-----------( 568      ( 26 Jul 2024 06:53 )             25.6   baso 0.2    eos 3.0    imm gran 1.0    lymph 20.5   mono 5.3    poly 70.0                         8.7    13.19 )-----------( 564      ( 25 Jul 2024 11:00 )             27.4   baso x      eos x      imm gran x      lymph x      mono x      poly x                            8.3    13.04 )-----------( 552      ( 25 Jul 2024 07:00 )             27.4   baso x      eos x      imm gran x      lymph x      mono x      poly x          MEDICATIONS  (STANDING):  acetaminophen     Tablet .. 975 milliGRAM(s) Oral every 6 hours  heparin   Injectable 5000 Unit(s) SubCutaneous every 8 hours  ibuprofen  Tablet. 600 milliGRAM(s) Oral every 6 hours  sodium chloride 0.9% lock flush 3 milliLiter(s) IV Push every 8 hours    MEDICATIONS  (PRN):  metoprolol tartrate Injectable 5 milliGRAM(s) IV Push every 6 hours PRN If SBP>160 and/or DBP >110  naloxone Injectable 0.1 milliGRAM(s) IV Push every 3 minutes PRN For ANY of the following changes in patient status:  A. RR LESS THAN 10 breaths per minute, B. Oxygen saturation LESS THAN 90%, C. Sedation score of 6  ondansetron Injectable 4 milliGRAM(s) IV Push every 6 hours PRN Nausea  oxyCODONE    IR 5 milliGRAM(s) Oral every 6 hours PRN Moderate Pain (4 - 6)  oxyCODONE    IR 10 milliGRAM(s) Oral every 6 hours PRN Severe Pain (7 - 10)

## 2024-07-28 NOTE — CONSULT NOTE ADULT - ASSESSMENT
28F h/o HTN not on meds, presents with 2 weeks of diarrhea and diffuse abdominal cramps. Found to have 15cm adnexal mass compressing her small bowel leading to small bowel dilation and obstruction.     Plan  - Patient not clinically obstructed: passing flatus/stool, no n/v in last 12 hours   - Small bowel dilation likely secondary to mass   - Will be available to assist ob/gyn team in operative planning   - Will follow     [facility], [id number] 
29yo G?, currently on her menses, who presented to the ED with x2 weeks of intermittent diffuse abdominal pain along with x3 days of intermittent n/v. CT demonstrated a large 15.8cm x 14.4cm likely left ovarian cyst suspicious for torsion along with radiographic findings demonstrating a small bowel obstruction. At time of initial evaluation, patient overall with benign exam and no signs to suggest an acute surgical abdomen. Clinically, patient does not appear to be obstructed with her reported bowel movements and passing of flatus. However, given her history and radiographic read, intermittent torsion is on the differential. TVUS to further characterize the mass was pending.     Recommendations  - Pending TVUS   - Patient signed out to night team, Dr. MIR Linton (PGY2)    Diogenes Fleming, PGY-3  Obstetrics & Gynecology 
28F w/ no PMHx admitted to GYN service for abdominal mass and SBP s/p ex-lap LSO with R ovarian cystectomy and repair of bowel serosa. Medicine consulted for elevated BPs likely in setting of pain.

## 2024-07-28 NOTE — PROGRESS NOTE ADULT - PROVIDER SPECIALTY LIST ADULT
Anesthesia
GYN
Anesthesia
Surgery
GYN
Surgery
GYN
GYN

## 2024-07-28 NOTE — PROVIDER CONTACT NOTE (CRITICAL VALUE NOTIFICATION) - ACTION/TREATMENT ORDERED:
Reinforce pt of symptoms to report: vertigo, shortness of breath, chest pain or weakness.  Call when OOB.

## 2024-07-28 NOTE — CONSULT NOTE ADULT - PROBLEM SELECTOR RECOMMENDATION 9
On 7/27 pt was noted to have BP reading of 160 which improved with lopressor 5mg to 140s, medicine consulted for BP management. Most recent /97  -per discussion with patient, at the time of elevated BP reading, she was experiencing pain at the surgical site after a cough/sneeze, she states that at this moment she is not in any pain. Suspect elevated BPs during hospital stay have been 2/2 pain. Given that she has no prior hx of HTN, would not initiate anti-hypertensive medications at this time.   -High BP likely 2/2 pain, would continue with pain regimen with appropriate bowel regimen per primary team  -Pt can follow up with PCP as outpatient for a BP check   -No contraindication to discharge from BP perspective

## 2024-08-08 ENCOUNTER — NON-APPOINTMENT (OUTPATIENT)
Age: 29
End: 2024-08-08

## 2024-08-08 ENCOUNTER — APPOINTMENT (OUTPATIENT)
Dept: OBGYN | Facility: CLINIC | Age: 29
End: 2024-08-08

## 2024-08-08 PROBLEM — I10 ESSENTIAL (PRIMARY) HYPERTENSION: Chronic | Status: ACTIVE | Noted: 2024-07-22

## 2024-08-08 PROCEDURE — 99203 OFFICE O/P NEW LOW 30 MIN: CPT | Mod: GC

## 2024-08-08 NOTE — END OF VISIT
[] : Fellow [FreeTextEntry3] : Patient seen and evaluated with resident and fellow. Agree with above. Patient is doing very well, now POD16 s/p detorsion and LSO, RO cystectomy, and repair of bowel serosa. She is not requiring any pain medication. Continues to meet postop milestones with normal BM/flatus, tolerating PO, etc. She denies any issues with midline vertical incision. Perineo dressing removed today with incision c/d/i. Patient would like to resume weight lifting and other strenuous workouts. Encouraged no heavy lifting or strenuous excerciese for a total of 6 weeks. She will follow up at GYN clinic or GYN of her choice for annual exam.

## 2024-08-08 NOTE — HISTORY OF PRESENT ILLNESS
[Pain is well-controlled] : pain is well-controlled [Clean/Dry/Intact] : clean, dry and intact [Healed] : healed [None] : no vaginal bleeding [Fever] : no fever [Chills] : no chills [Nausea] : no nausea [Diarrhea] : no diarrhea [Vaginal Bleeding] : no vaginal bleeding [Pelvic Pressure] : no pelvic pressure [Vaginal Discharge] : no vaginal discharge [Constipation] : no constipation [de-identified] : 16 [de-identified] : Ex-lap, LSO, RO cystectomy, SOFIA, repair of bowel serosa [de-identified] : 28y  LMP  POD#16 s/p ex-lap, LSO, RO cystectomy, SOFIA, repair of small bowel serosa (EBL=50) in setting of pelvic mass and SBO presenting for post-op visit. Today, pt reports feeling well. Reports occasional Tylenol use; pain is well controlled. Denies fevers, chills, n/v, inability to tolerate PO, vaginal bleeding. Reports regular BM, last this AM.   Surgical path (): pending, cytology neg for malignant cells  Hospital Course (-): Pt p/w 2w of diffuse abdominal pain, found to have large pelvic mass and radiographic SBO. CTAP on admission notable for markedly enlarged pelvic mass likely arising from the left ovary and associated small bowel obstruction. TVUS with large multiloculated cystic lesion within the left adnexa measuring up to 21.6 cm and corresponds to cystic areas seen on CT abdomen and pelvis. Arterial and venous waveforms not completely visualized. S/p ex-lap, LSO, RO cystectomy, repair of bowel serosa on  (EBL=50). Gen surg was present intra-operatively for SOFIA and repair of bowel serosa. Intra-op L ovary was noted to be torsed x3. Frozen path of L ovarian cyst=benign hemorrhagic cyst; cyst was removed intact. Post-operatively, NGT was removed on POD3 and pt was transitioned to regular diet on POD4. Pt was seen by hospitalist while admitted due to elevated BPs, thought to be 2/2 pain therefore pt was not started on antihypertensives and was encouraged to f/u with outpt PCP. Pt was discharged on POD5.  OBHx:  x1- 2012 GYNHx: as above, previously had not seen GYN in approx 10y. Denies h/o fibroids, abnormal Paps, STIs PMH: HTN PSH: as above Meds: multivitamin NKDA Soc: denies tobacco/drug use. Reports social alcohol use  [de-identified] : Pelvic mass, SBO [de-identified] : Midline vertical CDI. Dermabond mikayla removed

## 2024-08-08 NOTE — PLAN
[FreeTextEntry1] : A/P: 28y  LMP / POD#16 s/p ex-lap, LSO, RO cystectomy, SOFIA, repair of small bowel serosa (EBL=50) in setting of pelvic mass and SBO presenting for post-op visit. Pt recovering well post-operatively.  #Post-op state - Dermabond prineo removed - Surgical path pending, will contact pt with results. Pelvic washings resulted and noted to be negative - Post-surgical restrictions including avoidance of heavy lifting/nothing in vagina x6w reinforced  RTC for annual or PRN  Seen and evaluated with GILBERTO Dias attending and GILBERTO Diaz fellow [facility] PGY3  Fellow Addendum:  I personally saw and evaluated this patient and agree with the above resident's note. 27 y/o  POD#16 s/p ex-lap, LSO, RO cystectomy, SOFIA, repair of small bowel serosa in the setting of ovarian torsion and SBO presenting for post op visit. Patient doing well post-operatively and meeting all milestones. Tolerating PO with regular BMs/voids, pain well controlled. Incision well healing, abdominal exam benign. Pathology pending- will follow up with patient over phone. Discussed resuming weight-lifting after 6 weeks, may do light exercising and lift weights <10lbs in meantime. Pt to return for routine GYN care.  Eda Patel MD PGY6, [facility]

## 2024-08-12 LAB — SURGICAL PATHOLOGY STUDY: SIGNIFICANT CHANGE UP
